# Patient Record
Sex: MALE | Race: WHITE
[De-identification: names, ages, dates, MRNs, and addresses within clinical notes are randomized per-mention and may not be internally consistent; named-entity substitution may affect disease eponyms.]

---

## 2018-09-01 ENCOUNTER — HOSPITAL ENCOUNTER (INPATIENT)
Dept: HOSPITAL 92 - ERS | Age: 83
LOS: 9 days | Discharge: NURSING, REHAB OR LONG-TERM CARE | DRG: 177 | End: 2018-09-10
Attending: FAMILY MEDICINE | Admitting: FAMILY MEDICINE
Payer: MEDICARE

## 2018-09-01 VITALS — BODY MASS INDEX: 24.1 KG/M2

## 2018-09-01 DIAGNOSIS — T45.515A: ICD-10-CM

## 2018-09-01 DIAGNOSIS — K21.9: ICD-10-CM

## 2018-09-01 DIAGNOSIS — Z66: ICD-10-CM

## 2018-09-01 DIAGNOSIS — J96.21: ICD-10-CM

## 2018-09-01 DIAGNOSIS — M10.9: ICD-10-CM

## 2018-09-01 DIAGNOSIS — R04.2: ICD-10-CM

## 2018-09-01 DIAGNOSIS — I25.5: ICD-10-CM

## 2018-09-01 DIAGNOSIS — Z79.82: ICD-10-CM

## 2018-09-01 DIAGNOSIS — I42.0: ICD-10-CM

## 2018-09-01 DIAGNOSIS — J44.0: ICD-10-CM

## 2018-09-01 DIAGNOSIS — I25.10: ICD-10-CM

## 2018-09-01 DIAGNOSIS — I48.2: ICD-10-CM

## 2018-09-01 DIAGNOSIS — Z85.118: ICD-10-CM

## 2018-09-01 DIAGNOSIS — E78.5: ICD-10-CM

## 2018-09-01 DIAGNOSIS — I13.0: ICD-10-CM

## 2018-09-01 DIAGNOSIS — I50.43: ICD-10-CM

## 2018-09-01 DIAGNOSIS — Z79.51: ICD-10-CM

## 2018-09-01 DIAGNOSIS — Z87.891: ICD-10-CM

## 2018-09-01 DIAGNOSIS — D69.6: ICD-10-CM

## 2018-09-01 DIAGNOSIS — N17.9: ICD-10-CM

## 2018-09-01 DIAGNOSIS — Z90.2: ICD-10-CM

## 2018-09-01 DIAGNOSIS — I95.1: ICD-10-CM

## 2018-09-01 DIAGNOSIS — J44.1: ICD-10-CM

## 2018-09-01 DIAGNOSIS — J85.1: Primary | ICD-10-CM

## 2018-09-01 DIAGNOSIS — Z95.828: ICD-10-CM

## 2018-09-01 DIAGNOSIS — Z99.81: ICD-10-CM

## 2018-09-01 DIAGNOSIS — N18.3: ICD-10-CM

## 2018-09-01 DIAGNOSIS — G47.30: ICD-10-CM

## 2018-09-01 LAB
ALBUMIN SERPL BCG-MCNC: 3.7 G/DL (ref 3.4–4.8)
ALP SERPL-CCNC: 83 U/L (ref 40–150)
ALT SERPL W P-5'-P-CCNC: 18 U/L (ref 8–55)
ANION GAP SERPL CALC-SCNC: 10 MMOL/L (ref 10–20)
APTT PPP: 31.4 SEC (ref 22.9–36.1)
AST SERPL-CCNC: 18 U/L (ref 5–34)
BILIRUB SERPL-MCNC: 1.4 MG/DL (ref 0.2–1.2)
BUN SERPL-MCNC: 27 MG/DL (ref 8.4–25.7)
CALCIUM SERPL-MCNC: 10 MG/DL (ref 7.8–10.44)
CHLORIDE SERPL-SCNC: 97 MMOL/L (ref 98–107)
CK MB SERPL-MCNC: 2.3 NG/ML (ref 0–6.6)
CK SERPL-CCNC: 40 U/L (ref 30–200)
CO2 SERPL-SCNC: 34 MMOL/L (ref 23–31)
CREAT CL PREDICTED SERPL C-G-VRATE: 0 ML/MIN (ref 70–130)
GLOBULIN SER CALC-MCNC: 2.6 G/DL (ref 2.4–3.5)
GLUCOSE SERPL-MCNC: 121 MG/DL (ref 83–110)
HGB BLD-MCNC: 15.4 G/DL (ref 14–18)
INR PPP: 1.1
MCH RBC QN AUTO: 32.2 PG (ref 27–31)
MCV RBC AUTO: 93.7 FL (ref 78–98)
MDIFF COMPLETE?: YES
PLATELET # BLD AUTO: 129 THOU/UL (ref 130–400)
POTASSIUM SERPL-SCNC: 3.7 MMOL/L (ref 3.5–5.1)
PROTHROMBIN TIME: 14.5 SEC (ref 12–14.7)
RBC # BLD AUTO: 4.78 MILL/UL (ref 4.7–6.1)
SODIUM SERPL-SCNC: 137 MMOL/L (ref 136–145)
TROPONIN I SERPL DL<=0.01 NG/ML-MCNC: (no result) NG/ML (ref ?–0.03)
WBC # BLD AUTO: 14.1 THOU/UL (ref 4.8–10.8)

## 2018-09-01 PROCEDURE — 86901 BLOOD TYPING SEROLOGIC RH(D): CPT

## 2018-09-01 PROCEDURE — 71045 X-RAY EXAM CHEST 1 VIEW: CPT

## 2018-09-01 PROCEDURE — 83880 ASSAY OF NATRIURETIC PEPTIDE: CPT

## 2018-09-01 PROCEDURE — 96361 HYDRATE IV INFUSION ADD-ON: CPT

## 2018-09-01 PROCEDURE — 84484 ASSAY OF TROPONIN QUANT: CPT

## 2018-09-01 PROCEDURE — 94640 AIRWAY INHALATION TREATMENT: CPT

## 2018-09-01 PROCEDURE — 85025 COMPLETE CBC W/AUTO DIFF WBC: CPT

## 2018-09-01 PROCEDURE — 36415 COLL VENOUS BLD VENIPUNCTURE: CPT

## 2018-09-01 PROCEDURE — 80162 ASSAY OF DIGOXIN TOTAL: CPT

## 2018-09-01 PROCEDURE — 87040 BLOOD CULTURE FOR BACTERIA: CPT

## 2018-09-01 PROCEDURE — 94760 N-INVAS EAR/PLS OXIMETRY 1: CPT

## 2018-09-01 PROCEDURE — 93005 ELECTROCARDIOGRAM TRACING: CPT

## 2018-09-01 PROCEDURE — 86850 RBC ANTIBODY SCREEN: CPT

## 2018-09-01 PROCEDURE — C9113 INJ PANTOPRAZOLE SODIUM, VIA: HCPCS

## 2018-09-01 PROCEDURE — 80053 COMPREHEN METABOLIC PANEL: CPT

## 2018-09-01 PROCEDURE — 94660 CPAP INITIATION&MGMT: CPT

## 2018-09-01 PROCEDURE — 80048 BASIC METABOLIC PNL TOTAL CA: CPT

## 2018-09-01 PROCEDURE — 85730 THROMBOPLASTIN TIME PARTIAL: CPT

## 2018-09-01 PROCEDURE — 86900 BLOOD TYPING SEROLOGIC ABO: CPT

## 2018-09-01 PROCEDURE — A4216 STERILE WATER/SALINE, 10 ML: HCPCS

## 2018-09-01 PROCEDURE — 71046 X-RAY EXAM CHEST 2 VIEWS: CPT

## 2018-09-01 PROCEDURE — 83605 ASSAY OF LACTIC ACID: CPT

## 2018-09-01 PROCEDURE — 82553 CREATINE MB FRACTION: CPT

## 2018-09-01 PROCEDURE — 71250 CT THORAX DX C-: CPT

## 2018-09-01 PROCEDURE — 5A09457 ASSISTANCE WITH RESPIRATORY VENTILATION, 24-96 CONSECUTIVE HOURS, CONTINUOUS POSITIVE AIRWAY PRESSURE: ICD-10-PCS | Performed by: FAMILY MEDICINE

## 2018-09-01 PROCEDURE — 96365 THER/PROPH/DIAG IV INF INIT: CPT

## 2018-09-01 PROCEDURE — 85610 PROTHROMBIN TIME: CPT

## 2018-09-01 RX ADMIN — GUAIFENESIN AND DEXTROMETHORPHAN SCH ML: 100; 10 SYRUP ORAL at 16:12

## 2018-09-01 RX ADMIN — SACUBITRIL AND VALSARTAN SCH: 49; 51 TABLET, FILM COATED ORAL at 20:10

## 2018-09-01 RX ADMIN — GUAIFENESIN AND DEXTROMETHORPHAN SCH ML: 100; 10 SYRUP ORAL at 20:11

## 2018-09-01 NOTE — RAD
PA AND LATERAL CHEST:

 

Date:  09/01/18 

 

INDICATION:

History of hemoptysis. 

 

COMPARISON:  

Prior exam dated 09/01/18 at 1058 hours. 

 

FINDINGS:

The mass-like opacity involving the right mid lung may correspond to trapped fluid within portions of
 the right major fissure; however, there is an air fluid level that is much more evident on the curre
nt examination within the region of the right middle lobe which may reflect a cavitating lesion such 
as a lung abscess. There is opacity present within the right middle lobe. A component of pneumonia is
 not excluded. There are small bilateral pleural effusions. There is cardiomegaly and pulmonary vascu
lar congestion. 

 

IMPRESSION: 

1.      Findings suspicious for right middle lobe air space opacity with associated air fluid level m
ay reflect pneumonia and focal region of cavitation. Malignancy is not entirely excluded but is felt 
to be less likely. 

 

2.  There is a component of CHF. 

 

3.  Dedicated CT of the thorax with IV contrast is recommended for additional characterization. 

 

 

POS: [facility]

## 2018-09-01 NOTE — CT
CT OF CHEST PERFORMED WITHOUT CONTRAST ENHANCEMENT:

 

Date:  09/01/18 

 

HISTORY:  

Hemoptysis. 

 

COMPARISON:  

Chest x-ray done earlier today. CT examination performed 06/01/17. 

 

FINDINGS:

A right upper lobe area of pneumonic consolidation has developed. There is an air fluid level with a 
large cavity associated with this. The main portion of this cavitary area is 9.2 cm in AP dimension a
nd measures approximately 5.1 cm transversely. Superior to this, there is what may be dense consolida
tion of the right lung, or this may just represent a part of the liquefied cavitary process as it gambino
s appear to be possibly connected to this. There are surgical clips seen in the right hilum. I would 
favor this to be a pneumonic process with abscess. 

 

A stent-type device is seen in the descending thoracic aorta. It is unchanged in position since the p
revious CT study. The left lung is essentially clear. Chronic lung changes are seen in both lung fiel
ds. 

 

Extensive coronary calcifications are present. 

 

Visualized liver parenchyma shows no focal findings. Cystic appearing area in the left renal pelvis w
ith some perinephric fat stranding. This was actually present on the previous 06/01/17 study. This do
es not appear to represent an acute process. 

 

IMPRESSION: 

1.  Right upper lobe pneumonic-type process with what appears to be associated abscess formation with
 a large cavitary area with air fluid level. Surgical clips are also seen in this region. 

 

2.  Stable appearance to descending aortic stent-type device. 

 

3.  Extensive coronary artery calcification. 

 

 

POS: [facility]

## 2018-09-01 NOTE — PDOC.EVN
Event Note





- Event Note


Event Note: 





After admission, pt decompensated and required bipap, magnesium, solumedrol.  

He is currently breathing comfortably with sats in the 90's%.  IVF were held 

earlier.  Due to NPO status, IV contrast study today, and currentl creatinine 

of 1.4, will resume IVF.  Monitor for fluid overload and monitor renal 

function.  Pt is on entresto for heart failure - will need to hold this if 

there is any change with his renal function.

## 2018-09-01 NOTE — HP
DATE OF SERVICE:  09/01/2018

 

CHIEF COMPLAINT:  Coughing up blood.

 

HISTORY OF PRESENT ILLNESS:  This is an 83-year-old male with history of COPD 
and oxygen dependence, chronic atrial fibrillation on full anticoagulation, 
cardiomyopathy with chronic systolic and diastolic heart failure, history of 
lung cancer status post resection of the right upper and middle lobes, 
dyslipidemia, GERD, hypertension, who presents to the emergency room with the 
complaint of coughing up blood.  



The patient reports that he noticed a change in his breathing about 10 days ago
, describing it as harder to breathe.  He also states it was harder to keep his 
oxygen levels up and was averaging in the low 80s and high 70s, easily out of 
breath if he was to get up and start moving.  He has been sleeping either in a 
recliner or upright due to difficulty breathing.  At 8:00 a.m. this morning, he 
started coughing up blood, and noticed that over the past couple of days having 
a bubbling sound in his chest and his throat.  In addition, today he complains 
of pain on the right side of his chest and difficulty getting comfortable.  



He denies any fevers, chills, nausea, vomiting, lightheadedness or dizziness.  
He denies any precipitants, or prior history of similar presentation.  He did 
start to change his medications a couple days ago, looking for any of that 
would have the side effect on his breathing and changing them to just half the 
dose which includes digoxin, Eliquis, and Araids.  In addition, he contacted 
Dr. Calle's office and was instructed to call back for admission if he had not 
improved yesterday; however, he was feeling a little bit better.

 

Patient denies any significant weight change, no change in his p.o. intake.  He 
does have lower extremity swelling chronically, but reports that today is 
better than usual.

 

In the emergency room, the patient's chest x-ray abnormal for a cavity with a 
fluid level, CT of the chest obtained, patient started on Zosyn and Levaquin 
due to elevated white blood cell count, treated with 500 mL of normal saline, 
one DuoNeb and Hospitalist called for admission.

 

ALLERGIES:  No known drug allergies.

 

CURRENT MEDICATIONS:  Reconciled with the list provided by the patient:

1.  Albuterol every 4 hours.

2.  Flonase 1 spray each nostril twice a day.

3.  Ipratropium nasal spray twice a day.

4.  Digoxin 0.125 mg Monday, Wednesday, Friday, and Saturday.

5.  Carvedilol 12.5 mg b.i.d.

6.  Torsemide 100 mg daily.

7.  Eliquis 5 mg b.i.d.

8.  Flomax 0.4 mg b.i.d.

9.  Aspirin 81 mg daily.

10.  Lipitor 80 mg at night.

11.  Araids  capsule daily.

12.  Potassium chloride 20 mEq once daily.

13.  Milk of Magnesia at bedtime if needed.

14.  Phenazopyridine 100 mg as needed.

15.  Meclizine 10 mg as needed.

16.  Singulair 10 mg daily.

17.  Ginkgo Biloba b.i.d.

18.  B complex daily.

19.  Allopurinol 300 mg 1/2 tablet daily.

20.  Glycerin ointment in the anal region as needed.

21.  Mucinex 1600 mg at night.

22.  Vitamin B12 daily.

23.  Vitamin D daily.

24.  Docusate 100 mg in the morning.

25.  Tylenol as needed for pain.

26.  Gabapentin 300 mg b.i.d.

27.  Augmentin 500 mg b.i.d., although it does not say start or stop date.

28.  Diltiazem, but uncertain of this medication as on the list it states 'what 
is diltiazem'.

 

PAST MEDICAL HISTORY:

1.  Chronic hypoxic respiratory failure on home oxygen, between 2 and 3 liters 
nasal cannula.

2.  Severe chronic obstructive pulmonary disease.

3.  Coronary artery disease with cardiomyopathy and chronic systolic and 
diastolic heart failure, last ejection fraction in 2016 here was between 40% 
and 50%.  No recent echo is seen in our system.

4.  Chronic atrial fibrillation on full anticoagulation.

5.  Lung cancer with history of resection of the right upper and middle lobes.

6.  Dyslipidemia.

7.  Gastroesophageal reflux disease.

8.  Hypertension.

9.  Benign prostatic hypertrophy.

10.  Coronary artery disease.

11.  Macular degeneration.

12.  Chronic kidney disease, stage 3.

13.  Gout.

 

PAST SURGICAL HISTORY:

1.  Right upper and middle lobe lobectomies in 1986 for lung cancer.

2.  Right inguinal hernia repair.

3.  Umbilical hernia repair.

4.  Achilles repair.

5.  Back surgery x2.

6.  Hemorrhoidectomy.

7.  Aortic aneurysm repair.

8.  Dental implants.

 

FAMILY HISTORY:  Significant for heart failure.

 

SOCIAL HISTORY:  Patient with a 150-pack-year tobacco use, quit in 1998.  No 
alcohol or IV drug use.  He lives with his wife in [place] and his wife 
and daughter Mariaa Davey are his surrogate decision makers.

 

REVIEW OF SYSTEMS:  As noted above.  No change in his weight, fevers, chills, 
nausea, vomiting.  All remaining review of systems are reviewed and negative.

 

PHYSICAL EXAMINATION:

VITAL SIGNS:  Blood pressure 118/63, pulse 95, respirations 20, saturations 94% 
on 2 liters, although when I was in the room, it was 89% on 3 liters nasal 
cannula, temperature is 97.8.

GENERAL:  The patient is awake and alert, appears uncomfortable and tachypneic 
but not in extremis.

HEENT:  His pupils are equal and round.  Extraocular movements are intact.  
Tympanic membranes translucent.  Nasal mucosa is pink.  Oral mucosa is pink and 
moist.

NECK:  Supple, nontender.

LYMPHATICS:  No palpable cervical or supraclavicular lymphadenopathy.

LUNGS:  Rales on the right side, audible wheezing and rhonchi, with fair air 
movement and patient is tachypneic with symmetric chest rise.

HEART:  Distant heart sounds.  No significant murmurs.

ABDOMEN:  Soft.  Present bowel sounds.  No palpable abnormalities.

EXTREMITIES:  He has 1+ pitting edema bilateral.

NEUROLOGIC:  No focal deficits.

SKIN:  No visible rashes.

PSYCHIATRIC:  The patient is alert and oriented x4.

 

LABORATORY DATA:

1.  Personally reviewed.  CBC 14.1, 15.4, 44.8, 129.  INR is 1.1.

2.  Renal panel, 137, 3.7, 97, 34, 27, 1.42, 121.

3.  LFTs normal except total bilirubin of 1.4.  .

 

Chest x-ray is personally reviewed, which showed his findings suspicious for 
right middle lobe airspace opacity with associated air fluid level which may be 
pneumonia or focal region of cavitation.  Malignancy not entirely excluded, but 
felt to be less likely, component of CHF with recommendation for CT of the 
chest.

 

CT of the chest report is pending, by my review it shows cavity 5 cm x 9 cm 
that is fluid filled with associated densities throughout the right middle 
lobe.  Formal report is pending.

 

IMPRESSION:

1.  Hemoptysis in a patient with severe chronic obstructive pulmonary disease, 
chronic hypoxic respiratory failure, and concern for infection and cavitation.

2.  Chronic diastolic and systolic heart failure, this may be contributing some 
to the patient's tachypnea; however, the predominant issue is the hemoptysis.

3.  Chronic atrial fibrillation on chronic oral anticoagulation, and aspirin 
therapy.

4.  Chronic kidney disease stage 3, stable.

5.  Hypertension.

6.  Dyslipidemia.

7.  Benign prostatic hypertrophy.

8.  Gastroesophageal reflux disease.

9.  Macular degeneration.

10.  Gout.

 

PLAN:

1.  Admission to the hospital.

2.  Consultation with Pulmonology.

3.  Stopping the Eliquis and aspirin, scheduling DuoNebs every 4 hours with 
p.r.n. albuterol, cough suppressants.

4.  Due to the leukocytosis, we will continue the antibiotics started in the 
emergency room.

5.  Continuing his cardiac medications to include beta blocker, Entresto, statin
, digoxin.  Monitor renal function

6.  Continuing his gabapentin and allopurinol.

7.  We will type and screen in case transfusion is needed, patient is currently 
hemodynamically stable.

8.  Deep venous thrombosis prophylaxis with pneumatic compression devices.

9.  Gastrointestinal prophylaxis.  We will use IV Protonix as the patient will 
be n.p.o.

10.  Code status is DO NOT RESUSCITATE and this was confirmed with the patient, 
his wife, as well as his daughter.

11.  Anticipated length of stay will be based on interventions needed to manage 
the hemoptysis, as well as discerning the etiology for it, whether it is 
infection, versus bleeding into a bulla, versus other.

12.  The patient is at high risk in the current presentation.  The patient and 
family are aware of the severity of the current situation, and his wife reports 
that if it is his time that they are accepting of it.

13.  Reviewed the plan of care with the patient and family.  No questions or 
further needs at end of evaluation.

 

CHARLES

## 2018-09-01 NOTE — RAD
2 AP VIEWS OF CHEST:

 

Date:  09/01/18 

 

INDICATION:

Difficulty breathing. 

 

COMPARISON:  

Prior exam dated 01/25/18.  

 

FINDINGS:

There are small bilateral pleural effusions, right greater than left. There is some hazy density seen
 along the right heart border extending up and over into the right upper lobe suspicious for fluid wi
thin the right major fissure. There is cardiomegaly and pulmonary vascular congestion. There is posts
urgical change of prior CABG. No pneumothorax is evident.

 

IMPRESSION: 

1.  Findings suspicious for layered hemorrhage within the right major fissure. 2 view chest radiograp
h is recommended. 

 

2.  Cardiomegaly with pulmonary vascular congestion and small bilateral pleural effusions, suspicious
 of CHF. 

 

 

 

POS: [facility]

## 2018-09-02 LAB
ANION GAP SERPL CALC-SCNC: 10 MMOL/L (ref 10–20)
BASOPHILS # BLD AUTO: 0 THOU/UL (ref 0–0.2)
BASOPHILS NFR BLD AUTO: 0.3 % (ref 0–1)
BUN SERPL-MCNC: 31 MG/DL (ref 8.4–25.7)
CALCIUM SERPL-MCNC: 9.6 MG/DL (ref 7.8–10.44)
CHLORIDE SERPL-SCNC: 98 MMOL/L (ref 98–107)
CO2 SERPL-SCNC: 31 MMOL/L (ref 23–31)
CREAT CL PREDICTED SERPL C-G-VRATE: 50 ML/MIN (ref 70–130)
EOSINOPHIL # BLD AUTO: 0 THOU/UL (ref 0–0.7)
EOSINOPHIL NFR BLD AUTO: 0.2 % (ref 0–10)
GLUCOSE SERPL-MCNC: 162 MG/DL (ref 83–110)
HGB BLD-MCNC: 14.3 G/DL (ref 14–18)
LYMPHOCYTES # BLD: 0.5 THOU/UL (ref 1.2–3.4)
LYMPHOCYTES NFR BLD AUTO: 3.5 % (ref 21–51)
MCH RBC QN AUTO: 31.3 PG (ref 27–31)
MCV RBC AUTO: 95.3 FL (ref 78–98)
MDIFF COMPLETE?: YES
MONOCYTES # BLD AUTO: 0.2 THOU/UL (ref 0.11–0.59)
MONOCYTES NFR BLD AUTO: 1.6 % (ref 0–10)
NEUTROPHILS # BLD AUTO: 12.6 THOU/UL (ref 1.4–6.5)
NEUTROPHILS NFR BLD AUTO: 94.4 % (ref 42–75)
PLATELET # BLD AUTO: 119 THOU/UL (ref 130–400)
PLATELET BLD QL SMEAR: (no result)
POTASSIUM SERPL-SCNC: 4 MMOL/L (ref 3.5–5.1)
RBC # BLD AUTO: 4.55 MILL/UL (ref 4.7–6.1)
SODIUM SERPL-SCNC: 135 MMOL/L (ref 136–145)
WBC # BLD AUTO: 13.3 THOU/UL (ref 4.8–10.8)

## 2018-09-02 RX ADMIN — GUAIFENESIN AND DEXTROMETHORPHAN SCH ML: 100; 10 SYRUP ORAL at 17:17

## 2018-09-02 RX ADMIN — SACUBITRIL AND VALSARTAN SCH TAB: 49; 51 TABLET, FILM COATED ORAL at 08:46

## 2018-09-02 RX ADMIN — GUAIFENESIN AND DEXTROMETHORPHAN SCH ML: 100; 10 SYRUP ORAL at 21:04

## 2018-09-02 RX ADMIN — SACUBITRIL AND VALSARTAN SCH TAB: 49; 51 TABLET, FILM COATED ORAL at 21:04

## 2018-09-02 RX ADMIN — GUAIFENESIN AND DEXTROMETHORPHAN SCH: 100; 10 SYRUP ORAL at 05:25

## 2018-09-02 RX ADMIN — GUAIFENESIN AND DEXTROMETHORPHAN SCH: 100; 10 SYRUP ORAL at 08:46

## 2018-09-02 RX ADMIN — GUAIFENESIN AND DEXTROMETHORPHAN SCH ML: 100; 10 SYRUP ORAL at 12:47

## 2018-09-02 RX ADMIN — GUAIFENESIN AND DEXTROMETHORPHAN SCH: 100; 10 SYRUP ORAL at 01:04

## 2018-09-02 NOTE — PDOC.PN
- Subjective


Encounter Start Date: 09/02/18 (f/u hemoptysis)


Encounter Start Time: 07:07


Subjective: Pt remained on bipap overnight.  Denies any complaints or concerns


-: reports that he is more comfortable and sleepy.





- Objective


Resuscitation Status: 


 











Resuscitation Status           DNR:Do Not Resuscitate














Vital Signs & Weight: 


 Vital Signs (12 hours)











  Temp Pulse Resp BP Pulse Ox


 


 09/02/18 06:21   78   


 


 09/02/18 04:07      100


 


 09/02/18 03:57  97.2 F L  94  21 H  130/62  100


 


 09/02/18 01:07   88   


 


 09/02/18 01:06      93 L


 


 09/01/18 23:37  96.3 F L  79  19  105/58 L  95


 


 09/01/18 23:34   85   


 


 09/01/18 23:33      93 L


 


 09/01/18 21:00   92   111/59 L 


 


 09/01/18 20:00  98.6 F  85  18  105/57 L  98








 Weight











Weight                         208 lb 4.8 oz














I&O: 


 











 09/01/18 09/02/18 09/03/18





 06:59 06:59 06:59


 


Intake Total  1150 


 


Output Total  325 


 


Balance  825 











Result Diagrams: 


 09/02/18 03:48





 09/02/18 03:51


Additional Labs: 





300 ml bloody fluid suctioned in past 15 hours


EKG Reviewed by me: Yes (tele - a fib 70-90's, 8-12 beats VT at 15:30)





Phys Exam





- Physical Examination


Constitutional: NAD


on bipap - decreased breath sounds throughout right side.  No audible


wheezing/rhonchi or rales


Cardiovascular: no significant murmur, irregular


Gastrointestinal: soft, non-tender, no distention, positive bowel sounds


Musculoskeletal: no edema


Neurological: non-focal, moves all 4 limbs


Psychiatric: normal affect


Deviation from normal: answers questions appropriately





Dx/Plan


(1) Hemoptysis


Code(s): R04.2 - HEMOPTYSIS   Status: Acute   





(2) Acute and chronic respiratory failure


Code(s): J96.20 - ACUTE AND CHR RESP FAILURE, UNSP W HYPOXIA OR HYPERCAPNIA   

Status: Acute   


Qualifiers: 


   Respiratory failure complication: hypoxia   Qualified Code(s): J96.21 - 

Acute and chronic respiratory failure with hypoxia   





(3) Congestive heart failure


Code(s): I50.9 - HEART FAILURE, UNSPECIFIED   Status: Chronic   


Qualifiers: 


   Heart failure type: combined systolic and diastolic 





(4) COPD exacerbation


Code(s): J44.1 - CHRONIC OBSTRUCTIVE PULMONARY DISEASE W (ACUTE) EXACERBATION   

Status: Acute   





(5) Physical deconditioning


Code(s): R53.81 - OTHER MALAISE   Status: Chronic   





(6) CKD (chronic kidney disease) stage 3, GFR 30-59 ml/min


Code(s): N18.3 - CHRONIC KIDNEY DISEASE, STAGE 3 (MODERATE)   Status: Chronic   





- Plan





* Appreciate Pulm consult - on steroids, antibiotics, nebs, bipap and cough 

suppressant. 


* 


* Renal function stable - low UOP overnight, will slightly increase IVF rate 

and monitor for signs of volume overload


* Continue home meds including entresto for heart failure.  Hold on diuretic 

therapy for now - no overt signs of overload


* 


* Hold anticoagulation and aspirin due to hemoptysis


* 


* CBC this am ordered - to be drawn, type and screen performed yesterday


* 


* dvt prophy - scd's 


* gi prophy - IV protonix


* code status DNR


* 


* Pt remains at high risk in current condition


* 


* Breathing more comfortably today.  Will advance diet to liquids and add 

ensure - RN's only to offer if no nausea.  Will d/c IV protonix.

## 2018-09-02 NOTE — CON
DATE OF CONSULTATION:  09/02/2018.

 

REASON FOR CONSULTATION:  Heart failure.

 

PRIMARY CARDIOLOGIST:  Dr. Kal Calle.

 

HISTORY OF PRESENT ILLNESS:  Mr. Tomas is a very pleasant 83-year-old white gentleman who comes to 
the hospital for shortness of breath.  He has a history of COPD, atrial fibrillation, on Eliquis and 
dilated cardiomyopathy, comes in with hemoptysis and sats in the 70s.  He had to be placed on BiPAP a
nd 300 mL of blood were taken out of his lung by Dr. Cevallos yesterday.  He remains in the BiPAP, he st
ates he feels really short-winded and his sats dropped to the 70s without it.  He has been having to 
take an extra torsemide in the last 2-3 weeks every day to get some extra fluid out and he noticed th
at in the last few days, he was not getting any extra fluid out.  Denies fever or chills.  No chest p
ain, tightness, or pressure.

 

PAST MEDICAL HISTORY:

1.  Chronic obstructive pulmonary disease with 2-3 liters of home O2.

2.  Ischemic cardiomyopathy with an ejection fraction of 25%-30% on echo back in 2017.

3.  Chronic atrial fibrillation on full anticoagulation.

4.  History of lung cancer, status post right upper and middle lobes resection.

5.  Hyperlipidemia.

6.  GERD.

7.  Hypertension.

8.  Benign prostatic hypertrophy.

9.  Coronary artery disease.

10.  Macular degeneration.

11.  Chronic kidney disease stage 3.

12.  Gout.

 

PAST SURGICAL HISTORY:

1.  Right upper and middle lobe resections in 1996 for lung cancer.

2.  Right inguinal hernia repair.

3.  Umbilical hernia repair.

4.  Achilles repair.

5.  Back surgeries.

6.  Hemorrhoidectomy.

7.  Aortic aneurysm repair.

8.  Dental implants.

 

FAMILY HISTORY:  Noncontributory.  Does have history of heart failure in the past.

 

SOCIAL HISTORY:  Former smoker, quit in 1998.  No alcohol or drug use.

 

REVIEW OF SYSTEMS:  A 12-point review of systems was done and is all negative unless stated in histor
y of present illness.

 

ALLERGIES:  No known drug allergies.

 

OUTPATIENT MEDICATIONS:  Include 28 medication list.  Please see Dr. Jaramillo's note for further detail
s.

 

PHYSICAL EXAMINATION:

VITAL SIGNS:  Temperature 97.4, pulse 90, respiratory rate 20, satting 94% on 50% BiPAP, blood pressu
re 147/63.

GENERAL:  Awake, alert, oriented x3, in mild distress.

HEENT:  Normocephalic, atraumatic.

NECK:  Supple.

LUNGS:  Have reduced breath sounds bilateral.

CARDIOVASCULAR:  Distant heart sounds.  S1, S2, irregularly irregular.  Heart rate in the 80s-90s.

ABDOMEN:  Soft, positive bowel sounds.

EXTREMITIES:  1+ edema.

SKIN:  Warm and dry.

 

LABORATORY DATA:  His laboratory work was reviewed.  His CBC with a white count of 14, hemoglobin of 
15, hematocrit 44, platelet count of 129.  Coags were negative.  Chemistries were unremarkable except
 for BUN and creatinine of 1.42 and 27.  GFR was 48, glucose was 121, total bilirubin 1.4.  BNP was 1
88 and troponin was negative x1.  Albumin of 3.7.

 

IMAGING:  Chest x-ray shows what appears to be right middle lobe airspace opacity with air fluid leve
l reflecting either pneumonia or cavitation and some vascular engorgement.

 

CT of the chest showed right upper lobe pneumonic process associated with abscess and large cavitary 
area with air fluid level, descending aorta has a stent repair of 4 cm with stable extensive coronary
 artery calcifications.

 

EKG was reviewed, atrial fibrillation.

 

Telemetry was reviewed, atrial fibrillation with multiple PVCs and several runs of nonsustained VT.

 

ASSESSMENT AND PLAN:

1.  Acute on chronic systolic heart failure.

2.  Reduced left ventricular function at 25%-30% on echo back in 2017.

3.  Hemoptysis.

4.  Pneumonia, complicated.

5.  Nonsustained ventricular tachycardia.

 

PLAN:

1.  We will discuss with primary team and Dr. Cevallos about his fluid status.  I see he is getting 150 
mL an hour.  This was before we knew what his LV function was.  He has already started to develop a l
ittle lower extremity edema, which he tells me he did not have when he came in.  I would hold any int
ravenous fluids for now.  I would not give him any Lasix as he may be septic and his blood pressure i
s already borderline, would just continue as it is at this time.  If his clinical situation with his 
breathing worsens, we will plan on starting IV Lasix at that time.

2.  If he continues to have runs of nonsustained ventricular tachycardia, we will start amiodarone dr martinez; however, at this time, I would like to stay away from this given his severe chronic obstructive p
ulmonary disease.

3.  Continue BiPAP with primary team.

 

Thank you for letting us to participate in the care of your patient.  We will follow.

## 2018-09-02 NOTE — CON
DATE OF CONSULTATION:  09/01/2018

 

HISTORY OF PRESENT ILLNESS:  Genoveva is a patient seen by me several times in the past.  He has been 
seen by me since November of last year.  He has cardiomyopathy and chronic obstructive pulmonary dise
ase.  His COPD was not bad enough to preclude a lobectomy for non-small cell carcinoma many years ago
.

 

He presented with complaints of shortness of breath, cough, chest congestion, and feeling poorly for 
over 10 days.

 

He has not call the office or actually any of his physicians he has had.

 

He did have some hemoptysis coming up to this and his Eliquis dose was cut back.

 

PAST MEDICAL HISTORY:  Otherwise remarkable for,

1.  Coronary artery disease.

2.  Atrial fibrillation.

3.  Lipid disorder.

4.  Gastroesophageal reflux disease.

5.  Hypertension.

6.  BPH.

7.  History of macular degeneration.

8.  History of chronic kidney disease.

9.  History of gout.

10.  History of an inguinal herniorrhaphy, umbilical herniorrhaphy, Achilles tendon repair, two back 
surgeries, herniorrhaphy, abdominal aortic aneurysm repair.

 

SOCIAL HISTORY:  Non-smoker, nondrinker.  He is extremely heavy smoker.

 

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

 

REVIEW OF SYSTEMS:  Ten points otherwise negative.  His hemoptysis has been less than a teaspoon each
 time he coughs up some blood.

 

PHYSICAL EXAMINATION:

GENERAL:  When I saw him, he was in moderate respiratory distress.  He was placed on BiPAP when I saw
 him and given nebulizer treatment and 30 minutes later, he was reexamined and has reported dramatic 
improvement.

VITAL SIGNS:  He is afebrile, heart rates in 80s, respiratory rates in the teens, blood pressure is 9
0/52.

HEENT:  Pupils are equal.  Sclera is anicteric.

NECK:  Supple.

LUNGS:  Remarkable for rhonchi, more on the right than the left.

HEART:  Regular rhythm.  S1 and S2 are normal.

ABDOMEN:  Soft and nontender.

EXTREMITIES:  Without clubbing, cyanosis, or edema.

 

Chest radiograph shows a right mid lung infiltrate.  Chest CT suggestive of an alveolar infiltrate ar
ound large lung abscess.  His thoracic aortic stent is in place.

 

IMPRESSION:

1.  Lung abscess.

2.  Chronic obstructive pulmonary disease exacerbation.

3.  History of sleep apnea.

4.  Acute on chronic respiratory failure, now on BiPAP, clinically improved.

5.  History of atrial fibrillation.

6.  History of right lung bilobectomy.

7.  History of hypertension.

8.  Cardiomyopathy.

 

Mucolytics may be helpful, steroids were added, nebulizer treatments frequency will be increased.  Ge
ntle IV hydration to cover his insensible losses as reasonable.  I will be happy to follow with the o
ther physicians caring for him.

 

Critical care time 40 minutes.

## 2018-09-03 LAB
ANION GAP SERPL CALC-SCNC: 11 MMOL/L (ref 10–20)
BUN SERPL-MCNC: 44 MG/DL (ref 8.4–25.7)
CALCIUM SERPL-MCNC: 9.7 MG/DL (ref 7.8–10.44)
CHLORIDE SERPL-SCNC: 100 MMOL/L (ref 98–107)
CO2 SERPL-SCNC: 30 MMOL/L (ref 23–31)
CREAT CL PREDICTED SERPL C-G-VRATE: 49 ML/MIN (ref 70–130)
GLUCOSE SERPL-MCNC: 163 MG/DL (ref 83–110)
HGB BLD-MCNC: 12.2 G/DL (ref 14–18)
MCH RBC QN AUTO: 31.6 PG (ref 27–31)
MCV RBC AUTO: 94.8 FL (ref 78–98)
MDIFF COMPLETE?: YES
PLATELET # BLD AUTO: 109 THOU/UL (ref 130–400)
PLATELET BLD QL SMEAR: (no result)
POTASSIUM SERPL-SCNC: 4.1 MMOL/L (ref 3.5–5.1)
RBC # BLD AUTO: 3.85 MILL/UL (ref 4.7–6.1)
SODIUM SERPL-SCNC: 137 MMOL/L (ref 136–145)
WBC # BLD AUTO: 18.5 THOU/UL (ref 4.8–10.8)

## 2018-09-03 RX ADMIN — GUAIFENESIN AND DEXTROMETHORPHAN SCH ML: 100; 10 SYRUP ORAL at 17:30

## 2018-09-03 RX ADMIN — SACUBITRIL AND VALSARTAN SCH TAB: 49; 51 TABLET, FILM COATED ORAL at 09:17

## 2018-09-03 RX ADMIN — GUAIFENESIN AND DEXTROMETHORPHAN SCH: 100; 10 SYRUP ORAL at 09:20

## 2018-09-03 RX ADMIN — GUAIFENESIN AND DEXTROMETHORPHAN SCH ML: 100; 10 SYRUP ORAL at 11:54

## 2018-09-03 RX ADMIN — SACUBITRIL AND VALSARTAN SCH: 49; 51 TABLET, FILM COATED ORAL at 20:23

## 2018-09-03 RX ADMIN — GUAIFENESIN AND DEXTROMETHORPHAN SCH: 100; 10 SYRUP ORAL at 01:41

## 2018-09-03 RX ADMIN — GUAIFENESIN AND DEXTROMETHORPHAN SCH: 100; 10 SYRUP ORAL at 22:20

## 2018-09-03 RX ADMIN — GUAIFENESIN AND DEXTROMETHORPHAN SCH: 100; 10 SYRUP ORAL at 06:22

## 2018-09-03 NOTE — PDOC.PN
- Subjective


Encounter Start Date: 09/03/18


Encounter Start Time: 10:07


Subjective: SOB improved





- Objective


Resuscitation Status: 


 











Resuscitation Status           DNR:Do Not Resuscitate














MAR Reviewed: Yes


Vital Signs & Weight: 


 Vital Signs (12 hours)











  Temp Pulse Resp BP BP Pulse Ox


 


 09/03/18 10:01   112 H  24 H    97


 


 09/03/18 09:18     134/60  


 


 09/03/18 09:17   98    


 


 09/03/18 07:56  97.0 F L  83  27 H    98


 


 09/03/18 07:49  97.0 F L  83  27 H   125/63  94 L


 


 09/03/18 06:25   95    


 


 09/03/18 06:24   94  22 H    97


 


 09/03/18 04:23       97


 


 09/03/18 04:00  97.2 F L  80  17   121/66  99


 


 09/03/18 00:00  97.4 F L  80  22 H   98/47 L  98


 


 09/02/18 22:27       98








 Weight











Weight                         208 lb 11.2 oz














I&O: 


 











 09/02/18 09/03/18 09/04/18





 06:59 06:59 06:59


 


Intake Total 1150 2128 


 


Output Total 325 700 


 


Balance 825 1428 











Result Diagrams: 


 09/03/18 03:49





 09/03/18 03:49





Phys Exam





- Physical Examination


Neck: no JVD


hyperresonant, coarse, bilar rales/ rhonchi


Cardiovascular: no significant murmur, irregular


Gastrointestinal: soft, positive bowel sounds


Musculoskeletal: no edema





Dx/Plan


(1) Acute and chronic respiratory failure


Code(s): J96.20 - ACUTE AND CHR RESP FAILURE, UNSP W HYPOXIA OR HYPERCAPNIA   

Status: Acute   


Qualifiers: 


   Respiratory failure complication: hypoxia   Qualified Code(s): J96.21 - 

Acute and chronic respiratory failure with hypoxia   





(2) Hemoptysis


Code(s): R04.2 - HEMOPTYSIS   Status: Acute   





(3) MARIUM (acute kidney injury)


Code(s): N17.9 - ACUTE KIDNEY FAILURE, UNSPECIFIED   Status: Acute   Comment: 

Likely cardiorenal. Monitor. 


   





(4) COPD exacerbation


Code(s): J44.1 - CHRONIC OBSTRUCTIVE PULMONARY DISEASE W (ACUTE) EXACERBATION   

Status: Acute   





(5) Atrial fibrillation with rapid ventricular response


Code(s): I48.91 - UNSPECIFIED ATRIAL FIBRILLATION   Status: Chronic   Comment: 

rate controlled.


Continue Eliquis, Carvedilol.    





(6) CKD (chronic kidney disease) stage 3, GFR 30-59 ml/min


Code(s): N18.3 - CHRONIC KIDNEY DISEASE, STAGE 3 (MODERATE)   Status: Chronic   





(7) HTN (hypertension)


Code(s): I10 - ESSENTIAL (PRIMARY) HYPERTENSION   Status: Chronic   


Qualifiers: 


   Hypertension type: essential hypertension   Qualified Code(s): I10 - 

Essential (primary) hypertension   


Comment: Stable.


Fairly well controlled. Continue home regimen


Lisinopril started 1/27


   





- Plan


cont antibx, BIPAP, steroids, etc


-: discuss with pulmonology





* .

## 2018-09-03 NOTE — RAD
SINGLE VIEW OF THE CHEST:

 

INDICATION: 

History of pulmonary abscess and hemoptysis.

 

COMPARISON: 

CT of the thorax dated 9/1/18.

 

FINDINGS: 

The dense consolidation of the right upper lobe and right middle lobe is again seen.  There is enlarg
ing right pleural effusion.  Small left pleural effusion persists.  Chronic lung changes of the left 
lung are similar-appearing.  No pneumothorax is evident.

 

IMPRESSION: 

Persistent opacification of the right upper lobe and right middle lobe consistent with pneumonia.  Th
ere is enlarging small right pleural effusion.  There is a persistent tiny left pleural effusion.

 

POS: MARY

## 2018-09-04 RX ADMIN — GUAIFENESIN AND DEXTROMETHORPHAN SCH ML: 100; 10 SYRUP ORAL at 12:25

## 2018-09-04 RX ADMIN — GUAIFENESIN AND DEXTROMETHORPHAN SCH ML: 100; 10 SYRUP ORAL at 01:29

## 2018-09-04 RX ADMIN — GUAIFENESIN AND DEXTROMETHORPHAN SCH ML: 100; 10 SYRUP ORAL at 08:58

## 2018-09-04 RX ADMIN — GUAIFENESIN AND DEXTROMETHORPHAN SCH ML: 100; 10 SYRUP ORAL at 20:35

## 2018-09-04 RX ADMIN — SACUBITRIL AND VALSARTAN SCH TAB: 49; 51 TABLET, FILM COATED ORAL at 21:53

## 2018-09-04 RX ADMIN — SACUBITRIL AND VALSARTAN SCH TAB: 49; 51 TABLET, FILM COATED ORAL at 08:58

## 2018-09-04 RX ADMIN — GUAIFENESIN AND DEXTROMETHORPHAN SCH ML: 100; 10 SYRUP ORAL at 05:53

## 2018-09-04 RX ADMIN — Medication SCH ML: at 09:00

## 2018-09-04 RX ADMIN — GUAIFENESIN AND DEXTROMETHORPHAN SCH ML: 100; 10 SYRUP ORAL at 17:28

## 2018-09-04 RX ADMIN — Medication SCH ML: at 20:49

## 2018-09-04 NOTE — PDOC.PN
- Subjective


Encounter Start Date: 09/04/18


Encounter Start Time: 10:28


Subjective: not tolerating off bipap





- Objective


Resuscitation Status: 


 











Resuscitation Status           DNR:Do Not Resuscitate














Vital Signs & Weight: 


 Vital Signs (12 hours)











  Temp Pulse Resp BP BP Pulse Ox


 


 09/04/18 08:58     125/65  


 


 09/04/18 08:00  96.1 F L  92  21 H    97


 


 09/04/18 07:43  96.1 F L  92  21 H   124/64  92 L


 


 09/04/18 07:25   93    


 


 09/04/18 07:22   93  22 H    92 L


 


 09/04/18 04:31   90    


 


 09/04/18 04:30       95


 


 09/04/18 04:00  96.8 F L  85  16   98/56 L  93 L


 


 09/04/18 00:30   83    


 


 09/04/18 00:27       90 L


 


 09/04/18 00:00  96.5 F L  86  16   99/57 L  93 L








 Weight











Weight                         219 lb 6 oz














I&O: 


 











 09/03/18 09/04/18 09/05/18





 06:59 06:59 06:59


 


Intake Total 2128 2472 


 


Output Total 700 1100 


 


Balance 1428 1372 











Result Diagrams: 


 09/03/18 03:49





 09/03/18 03:49





Phys Exam





- Physical Examination


Neck: no JVD


hyperesonant, distant BS with rhonchi


Cardiovascular: RRR


Gastrointestinal: soft, positive bowel sounds


Musculoskeletal: edema present





Dx/Plan


(1) Acute and chronic respiratory failure


Code(s): J96.20 - ACUTE AND CHR RESP FAILURE, UNSP W HYPOXIA OR HYPERCAPNIA   

Status: Acute   


Qualifiers: 


   Respiratory failure complication: hypoxia   Qualified Code(s): J96.21 - 

Acute and chronic respiratory failure with hypoxia   





(2) Hemoptysis


Code(s): R04.2 - HEMOPTYSIS   Status: Acute   





(3) MARIUM (acute kidney injury)


Code(s): N17.9 - ACUTE KIDNEY FAILURE, UNSPECIFIED   Status: Acute   Comment: 

Likely cardiorenal. Monitor. 


   





(4) COPD exacerbation


Code(s): J44.1 - CHRONIC OBSTRUCTIVE PULMONARY DISEASE W (ACUTE) EXACERBATION   

Status: Acute   





(5) Atrial fibrillation with rapid ventricular response


Code(s): I48.91 - UNSPECIFIED ATRIAL FIBRILLATION   Status: Chronic   Comment: 

rate controlled.


Continue Eliquis, Carvedilol.    





(6) CKD (chronic kidney disease) stage 3, GFR 30-59 ml/min


Code(s): N18.3 - CHRONIC KIDNEY DISEASE, STAGE 3 (MODERATE)   Status: Chronic   





(7) HTN (hypertension)


Code(s): I10 - ESSENTIAL (PRIMARY) HYPERTENSION   Status: Chronic   


Qualifiers: 


   Hypertension type: essential hypertension   Qualified Code(s): I10 - 

Essential (primary) hypertension   


Comment: Stable.


Fairly well controlled. Continue home regimen


Lisinopril started 1/27


   





- Plan


poor prognosis, not tolerating unsupported respiration


-: cont bipap, antibx, nebs, iv glucocorticoids





* .

## 2018-09-05 LAB
ANION GAP SERPL CALC-SCNC: 10 MMOL/L (ref 10–20)
BUN SERPL-MCNC: 40 MG/DL (ref 8.4–25.7)
CALCIUM SERPL-MCNC: 9.6 MG/DL (ref 7.8–10.44)
CHLORIDE SERPL-SCNC: 99 MMOL/L (ref 98–107)
CO2 SERPL-SCNC: 36 MMOL/L (ref 23–31)
CREAT CL PREDICTED SERPL C-G-VRATE: 60 ML/MIN (ref 70–130)
GLUCOSE SERPL-MCNC: 165 MG/DL (ref 83–110)
POTASSIUM SERPL-SCNC: 4.5 MMOL/L (ref 3.5–5.1)
SODIUM SERPL-SCNC: 140 MMOL/L (ref 136–145)

## 2018-09-05 RX ADMIN — Medication SCH ML: at 09:15

## 2018-09-05 RX ADMIN — GUAIFENESIN AND DEXTROMETHORPHAN SCH ML: 100; 10 SYRUP ORAL at 05:17

## 2018-09-05 RX ADMIN — GUAIFENESIN AND DEXTROMETHORPHAN SCH ML: 100; 10 SYRUP ORAL at 09:14

## 2018-09-05 RX ADMIN — GUAIFENESIN AND DEXTROMETHORPHAN SCH ML: 100; 10 SYRUP ORAL at 20:54

## 2018-09-05 RX ADMIN — SACUBITRIL AND VALSARTAN SCH TAB: 49; 51 TABLET, FILM COATED ORAL at 09:12

## 2018-09-05 RX ADMIN — SACUBITRIL AND VALSARTAN SCH TAB: 49; 51 TABLET, FILM COATED ORAL at 20:54

## 2018-09-05 RX ADMIN — GUAIFENESIN AND DEXTROMETHORPHAN SCH ML: 100; 10 SYRUP ORAL at 17:54

## 2018-09-05 RX ADMIN — Medication SCH ML: at 20:54

## 2018-09-05 RX ADMIN — GUAIFENESIN AND DEXTROMETHORPHAN SCH ML: 100; 10 SYRUP ORAL at 12:39

## 2018-09-05 RX ADMIN — GUAIFENESIN AND DEXTROMETHORPHAN SCH ML: 100; 10 SYRUP ORAL at 00:47

## 2018-09-05 NOTE — PDOC.PN
- Subjective


Encounter Start Date: 09/05/18


Encounter Start Time: 10:53


Subjective: sob improved, no hemoptysis





- Objective


Resuscitation Status: 


 











Resuscitation Status           DNR:Do Not Resuscitate














MAR Reviewed: Yes


Vital Signs & Weight: 


 Vital Signs (12 hours)











  Temp Pulse Resp BP BP Pulse Ox


 


 09/05/18 09:13     150/76 H  


 


 09/05/18 09:12   101 H    


 


 09/05/18 08:00  97.1 F L  92  17    96


 


 09/05/18 07:47  97.1 F L  92  17   114/50 L  98


 


 09/05/18 07:37   93    


 


 09/05/18 07:32   89  27 H    96


 


 09/05/18 04:00  98.7 F  100  20   107/49 L  94 L


 


 09/05/18 03:27   85  18    95


 


 09/05/18 01:32   84  18    95


 


 09/05/18 00:00  98.0 F  101 H  26 H   124/73  92 L








 Weight











Weight                         220 lb 7 oz














I&O: 


 











 09/04/18 09/05/18 09/06/18





 06:59 06:59 06:59


 


Intake Total 2472 2596 


 


Output Total 1100 1650 


 


Balance 1372 946 











Result Diagrams: 


 09/03/18 03:49





 09/05/18 03:19





Phys Exam





- Physical Examination


Neck: no JVD


Respiratory: clear to auscultation bilateral


Cardiovascular: no significant murmur, irregular


Gastrointestinal: soft, positive bowel sounds


Musculoskeletal: no edema





Dx/Plan


(1) Acute and chronic respiratory failure


Code(s): J96.20 - ACUTE AND CHR RESP FAILURE, UNSP W HYPOXIA OR HYPERCAPNIA   

Status: Acute   


Qualifiers: 


   Respiratory failure complication: hypoxia   Qualified Code(s): J96.21 - 

Acute and chronic respiratory failure with hypoxia   





(2) Hemoptysis


Code(s): R04.2 - HEMOPTYSIS   Status: Acute   





(3) MARIUM (acute kidney injury)


Code(s): N17.9 - ACUTE KIDNEY FAILURE, UNSPECIFIED   Status: Acute   Comment: 

Likely cardiorenal. Monitor. 


   





(4) COPD exacerbation


Code(s): J44.1 - CHRONIC OBSTRUCTIVE PULMONARY DISEASE W (ACUTE) EXACERBATION   

Status: Acute   





(5) Atrial fibrillation with rapid ventricular response


Code(s): I48.91 - UNSPECIFIED ATRIAL FIBRILLATION   Status: Chronic   Comment: 

rate controlled.


Continue Eliquis, Carvedilol.    





(6) CKD (chronic kidney disease) stage 3, GFR 30-59 ml/min


Code(s): N18.3 - CHRONIC KIDNEY DISEASE, STAGE 3 (MODERATE)   Status: Chronic   





(7) HTN (hypertension)


Code(s): I10 - ESSENTIAL (PRIMARY) HYPERTENSION   Status: Chronic   


Qualifiers: 


   Hypertension type: essential hypertension   Qualified Code(s): I10 - 

Essential (primary) hypertension   


Comment: Stable.


Fairly well controlled. Continue home regimen


Lisinopril started 1/27


   





(8) Cardiomyopathy


Code(s): I42.9 - CARDIOMYOPATHY, UNSPECIFIED   Status: Acute   





(9) Acute on chronic systolic (congestive) heart failure


Code(s): I50.23 - ACUTE ON CHRONIC SYSTOLIC (CONGESTIVE) HEART FAILURE   Status

: Acute   





- Plan


on iv diuresis, cont coreg, dig entresto


-: cont antibx, nebs, steroids, etc


-: discuss with Card. and Pulmonology





* .

## 2018-09-06 LAB
ANION GAP SERPL CALC-SCNC: 9 MMOL/L (ref 10–20)
BUN SERPL-MCNC: 35 MG/DL (ref 8.4–25.7)
CALCIUM SERPL-MCNC: 9.4 MG/DL (ref 7.8–10.44)
CHLORIDE SERPL-SCNC: 98 MMOL/L (ref 98–107)
CO2 SERPL-SCNC: 35 MMOL/L (ref 23–31)
CREAT CL PREDICTED SERPL C-G-VRATE: 65 ML/MIN (ref 70–130)
DIGOXIN SERPL-MCNC: 0.48 NG/ML (ref 0.8–2)
GLUCOSE SERPL-MCNC: 186 MG/DL (ref 83–110)
POTASSIUM SERPL-SCNC: 4.2 MMOL/L (ref 3.5–5.1)
SODIUM SERPL-SCNC: 138 MMOL/L (ref 136–145)

## 2018-09-06 RX ADMIN — GUAIFENESIN AND DEXTROMETHORPHAN SCH ML: 100; 10 SYRUP ORAL at 13:14

## 2018-09-06 RX ADMIN — GUAIFENESIN AND DEXTROMETHORPHAN SCH: 100; 10 SYRUP ORAL at 01:00

## 2018-09-06 RX ADMIN — GUAIFENESIN AND DEXTROMETHORPHAN SCH ML: 100; 10 SYRUP ORAL at 16:12

## 2018-09-06 RX ADMIN — SACUBITRIL AND VALSARTAN SCH TAB: 49; 51 TABLET, FILM COATED ORAL at 20:55

## 2018-09-06 RX ADMIN — GUAIFENESIN AND DEXTROMETHORPHAN SCH ML: 100; 10 SYRUP ORAL at 20:54

## 2018-09-06 RX ADMIN — GUAIFENESIN AND DEXTROMETHORPHAN SCH ML: 100; 10 SYRUP ORAL at 09:36

## 2018-09-06 RX ADMIN — Medication SCH ML: at 20:55

## 2018-09-06 RX ADMIN — GUAIFENESIN AND DEXTROMETHORPHAN SCH: 100; 10 SYRUP ORAL at 05:50

## 2018-09-06 RX ADMIN — SACUBITRIL AND VALSARTAN SCH: 49; 51 TABLET, FILM COATED ORAL at 09:36

## 2018-09-06 RX ADMIN — Medication SCH ML: at 09:38

## 2018-09-06 NOTE — PDOC.PN
- Subjective


Encounter Start Date: 09/06/18


Encounter Start Time: 12:35


Subjective: sob improving, some scant hemoptysis today





- Objective


Resuscitation Status: 


 











Resuscitation Status           DNR:Do Not Resuscitate














MAR Reviewed: Yes


Vital Signs & Weight: 


 Vital Signs (12 hours)











  Temp Pulse Resp BP BP Pulse Ox


 


 09/06/18 12:22   96  20    100


 


 09/06/18 11:14   88  18   138/69  95


 


 09/06/18 10:05   96  16    100


 


 09/06/18 09:32     116/73  


 


 09/06/18 07:30  97.1 F L  69  18   131/65  97


 


 09/06/18 06:50       98


 


 09/06/18 06:48   98  20    98


 


 09/06/18 04:00  97.5 F L  106 H  22 H   134/51 L  94 L


 


 09/06/18 03:03   86  20    97


 


 09/06/18 01:30   79    


 


 09/06/18 00:36   89  20    97








 Weight











Weight                         212 lb 1 oz














I&O: 


 











 09/05/18 09/06/18 09/07/18





 06:59 06:59 06:59


 


Intake Total 2596 2626 


 


Output Total 1650 1952 


 


Balance 946 674 











Result Diagrams: 


 09/03/18 03:49





 09/06/18 04:22





Phys Exam





- Physical Examination


Neck: no JVD


grossly clear, clear on Left


Cardiovascular: no significant murmur, irregular


Gastrointestinal: soft, positive bowel sounds


Musculoskeletal: edema present





Dx/Plan


(1) Acute and chronic respiratory failure


Code(s): J96.20 - ACUTE AND CHR RESP FAILURE, UNSP W HYPOXIA OR HYPERCAPNIA   

Status: Acute   


Qualifiers: 


   Respiratory failure complication: hypoxia   Qualified Code(s): J96.21 - 

Acute and chronic respiratory failure with hypoxia   





(2) Hemoptysis


Code(s): R04.2 - HEMOPTYSIS   Status: Acute   





(3) MARIUM (acute kidney injury)


Code(s): N17.9 - ACUTE KIDNEY FAILURE, UNSPECIFIED   Status: Resolved   Comment

: Likely cardiorenal. Monitor. 


   





(4) COPD exacerbation


Code(s): J44.1 - CHRONIC OBSTRUCTIVE PULMONARY DISEASE W (ACUTE) EXACERBATION   

Status: Acute   





(5) Atrial fibrillation with rapid ventricular response


Code(s): I48.91 - UNSPECIFIED ATRIAL FIBRILLATION   Status: Chronic   Comment: 

rate controlled.


Continue Eliquis, Carvedilol.    





(6) CKD (chronic kidney disease) stage 3, GFR 30-59 ml/min


Code(s): N18.3 - CHRONIC KIDNEY DISEASE, STAGE 3 (MODERATE)   Status: Chronic   





(7) HTN (hypertension)


Code(s): I10 - ESSENTIAL (PRIMARY) HYPERTENSION   Status: Chronic   


Qualifiers: 


   Hypertension type: essential hypertension   Qualified Code(s): I10 - 

Essential (primary) hypertension   


Comment: Stable.


Fairly well controlled. Continue home regimen


Lisinopril started 1/27


   





(8) Cardiomyopathy


Code(s): I42.9 - CARDIOMYOPATHY, UNSPECIFIED   Status: Acute   





(9) Acute on chronic systolic (congestive) heart failure


Code(s): I50.23 - ACUTE ON CHRONIC SYSTOLIC (CONGESTIVE) HEART FAILURE   Status

: Acute   





- Plan


cont iv lasix, dig coreg, entresto


-: cont antibx nebs, steroids, O2 , etc


-: discuss with Dr Cevallos





* .

## 2018-09-06 NOTE — RAD
CHEST 1 VIEW:

 

HISTORY: 

CHF.  Dyspnea.

 

COMPARISON: 

9/3/18.

 

FINDINGS: 

Cardiac silhouette magnified and enlarged.  Opacification of the right middle and upper lobes is unch
anged from the previous exam.  Postoperative changes mediastinum.  Pulmonary vasculature remains uppe
r limits of normal.  No evidence of pneumothorax.  Postoperative changes right shoulder.

 

IMPRESSION: 

Right upper and middle lobe opacification and other findings are stable.

 

POS: MARY

## 2018-09-07 LAB
ANION GAP SERPL CALC-SCNC: 11 MMOL/L (ref 10–20)
BUN SERPL-MCNC: 30 MG/DL (ref 8.4–25.7)
CALCIUM SERPL-MCNC: 9.2 MG/DL (ref 7.8–10.44)
CHLORIDE SERPL-SCNC: 97 MMOL/L (ref 98–107)
CO2 SERPL-SCNC: 33 MMOL/L (ref 23–31)
CREAT CL PREDICTED SERPL C-G-VRATE: 73 ML/MIN (ref 70–130)
GLUCOSE SERPL-MCNC: 168 MG/DL (ref 83–110)
HGB BLD-MCNC: 12.6 G/DL (ref 14–18)
MCH RBC QN AUTO: 31.5 PG (ref 27–31)
MCV RBC AUTO: 95.6 FL (ref 78–98)
MDIFF COMPLETE?: YES
PLATELET # BLD AUTO: 131 THOU/UL (ref 130–400)
PLATELET BLD QL SMEAR: (no result)
POTASSIUM SERPL-SCNC: 4 MMOL/L (ref 3.5–5.1)
RBC # BLD AUTO: 4 MILL/UL (ref 4.7–6.1)
SODIUM SERPL-SCNC: 137 MMOL/L (ref 136–145)
WBC # BLD AUTO: 16.7 THOU/UL (ref 4.8–10.8)

## 2018-09-07 RX ADMIN — AMOXICILLIN AND CLAVULANATE POTASSIUM SCH MG: 875; 125 TABLET, FILM COATED ORAL at 21:01

## 2018-09-07 RX ADMIN — GUAIFENESIN AND DEXTROMETHORPHAN SCH ML: 100; 10 SYRUP ORAL at 06:00

## 2018-09-07 RX ADMIN — GUAIFENESIN AND DEXTROMETHORPHAN SCH ML: 100; 10 SYRUP ORAL at 00:01

## 2018-09-07 RX ADMIN — SACUBITRIL AND VALSARTAN SCH TAB: 49; 51 TABLET, FILM COATED ORAL at 21:01

## 2018-09-07 RX ADMIN — SACUBITRIL AND VALSARTAN SCH TAB: 49; 51 TABLET, FILM COATED ORAL at 08:45

## 2018-09-07 RX ADMIN — Medication SCH ML: at 21:02

## 2018-09-07 RX ADMIN — Medication SCH ML: at 08:48

## 2018-09-07 RX ADMIN — GUAIFENESIN AND DEXTROMETHORPHAN SCH ML: 100; 10 SYRUP ORAL at 08:45

## 2018-09-07 RX ADMIN — GUAIFENESIN AND DEXTROMETHORPHAN SCH ML: 100; 10 SYRUP ORAL at 21:01

## 2018-09-07 RX ADMIN — GUAIFENESIN AND DEXTROMETHORPHAN SCH ML: 100; 10 SYRUP ORAL at 16:11

## 2018-09-07 RX ADMIN — GUAIFENESIN AND DEXTROMETHORPHAN SCH ML: 100; 10 SYRUP ORAL at 13:28

## 2018-09-08 LAB
ANION GAP SERPL CALC-SCNC: 8 MMOL/L (ref 10–20)
BUN SERPL-MCNC: 37 MG/DL (ref 8.4–25.7)
CALCIUM SERPL-MCNC: 9.2 MG/DL (ref 7.8–10.44)
CHLORIDE SERPL-SCNC: 96 MMOL/L (ref 98–107)
CO2 SERPL-SCNC: 37 MMOL/L (ref 23–31)
CREAT CL PREDICTED SERPL C-G-VRATE: 65 ML/MIN (ref 70–130)
GLUCOSE SERPL-MCNC: 175 MG/DL (ref 83–110)
POTASSIUM SERPL-SCNC: 3.9 MMOL/L (ref 3.5–5.1)
SODIUM SERPL-SCNC: 137 MMOL/L (ref 136–145)

## 2018-09-08 RX ADMIN — Medication SCH ML: at 21:08

## 2018-09-08 RX ADMIN — AMOXICILLIN AND CLAVULANATE POTASSIUM SCH MG: 875; 125 TABLET, FILM COATED ORAL at 21:08

## 2018-09-08 RX ADMIN — GUAIFENESIN AND DEXTROMETHORPHAN SCH ML: 100; 10 SYRUP ORAL at 05:23

## 2018-09-08 RX ADMIN — GUAIFENESIN AND DEXTROMETHORPHAN SCH ML: 100; 10 SYRUP ORAL at 09:08

## 2018-09-08 RX ADMIN — Medication SCH ML: at 09:06

## 2018-09-08 RX ADMIN — GUAIFENESIN AND DEXTROMETHORPHAN SCH ML: 100; 10 SYRUP ORAL at 17:08

## 2018-09-08 RX ADMIN — SACUBITRIL AND VALSARTAN SCH TAB: 49; 51 TABLET, FILM COATED ORAL at 09:05

## 2018-09-08 RX ADMIN — GUAIFENESIN AND DEXTROMETHORPHAN SCH ML: 100; 10 SYRUP ORAL at 15:08

## 2018-09-08 RX ADMIN — AMOXICILLIN AND CLAVULANATE POTASSIUM SCH MG: 875; 125 TABLET, FILM COATED ORAL at 09:03

## 2018-09-08 RX ADMIN — SACUBITRIL AND VALSARTAN SCH TAB: 49; 51 TABLET, FILM COATED ORAL at 21:07

## 2018-09-08 RX ADMIN — GUAIFENESIN AND DEXTROMETHORPHAN SCH: 100; 10 SYRUP ORAL at 02:08

## 2018-09-08 RX ADMIN — GUAIFENESIN AND DEXTROMETHORPHAN SCH ML: 100; 10 SYRUP ORAL at 21:08

## 2018-09-08 NOTE — PDOC.PN
- Subjective


Encounter Start Date: 09/08/18


Encounter Start Time: 13:00





Patient is seen today, c/o Dizziness on Stbnadoing and Sitting, but Normal with 

lying down, likely has orthostatic [id number].





- Objective


Resuscitation Status: 


 











Resuscitation Status           DNR:Do Not Resuscitate














MAR Reviewed: Yes


Vital Signs & Weight: 


 Vital Signs (12 hours)











  Temp Pulse Pulse Pulse Pulse Resp BP


 


 09/08/18 12:08   84     16 


 


 09/08/18 11:18  97.1 F L  84     19 


 


 09/08/18 11:15    74  39 L  70  


 


 09/08/18 09:04   99     


 


 09/08/18 09:01        107/52 L


 


 09/08/18 08:15   76     16 


 


 09/08/18 08:00  97.1 F L  99     16 


 


 09/08/18 07:27  97.1 F L  77     18 


 


 09/08/18 04:00  97.2 F L  84     22 H 


 


 09/08/18 02:47   103 H     16 














  BP BP BP BP Pulse Ox


 


 09/08/18 12:08     


 


 09/08/18 11:18     96/56 L  90 L


 


 09/08/18 11:15  96/56 L  45/38 L  115/55 L  


 


 09/08/18 09:04     


 


 09/08/18 09:01     


 


 09/08/18 08:15     


 


 09/08/18 08:00      95


 


 09/08/18 07:27     107/58 L  95


 


 09/08/18 04:00     120/62  100


 


 09/08/18 02:47      95








 Weight











Admit Weight                   205 lb


 


Weight                         209 lb














I&O: 


 











 09/07/18 09/08/18 09/09/18





 06:59 06:59 06:59


 


Intake Total 2142 1180 


 


Output Total 8281 2925 


 


Balance 217 -1745 











Result Diagrams: 


 09/07/18 11:51





 09/08/18 03:21


Radiology Reviewed by me: Yes





Phys Exam





- Physical Examination


HEENT: PERRLA, moist MMs


Neck: no nodes, no JVD


Respiratory: no wheezing, no rales


Cardiovascular: RRR, no significant murmur


Gastrointestinal: soft, non-tender


Musculoskeletal: no edema, pulses present


Neurological: non-focal, normal sensation


Lymphatic: no nodes


Psychiatric: normal affect, A&O x 3





Dx/Plan


(1) Acute and chronic respiratory failure


Code(s): J96.20 - ACUTE AND CHR RESP FAILURE, UNSP W HYPOXIA OR HYPERCAPNIA   

Status: Acute   


Qualifiers: 


   Respiratory failure complication: hypoxia   Qualified Code(s): J96.21 - 

Acute and chronic respiratory failure with hypoxia   


Comment: Improving off of Bipap now. Will continue with NEbs today, pt will be 

moved to floor if ok with Dr. Cevallos   





(2) Acute on chronic systolic (congestive) heart failure


Code(s): I50.23 - ACUTE ON CHRONIC SYSTOLIC (CONGESTIVE) HEART FAILURE   Status

: Acute   Comment:  ACE/ BB/ Digoxin. Pt diuresis need to be put on Hold,. pt 

developing orthostasis.   





(3) Cardiomyopathy


Code(s): I42.9 - CARDIOMYOPATHY, UNSPECIFIED   Status: Acute   Comment: 

continue with BB, ACEI.    





(4) Hemoptysis


Code(s): R04.2 - HEMOPTYSIS   Status: Acute   Comment: Persistant but in 

Streaks of Blood in sputum. No drop in Hb.   





(5) COPD exacerbation


Code(s): J44.1 - CHRONIC OBSTRUCTIVE PULMONARY DISEASE W (ACUTE) EXACERBATION   

Status: Acute   Comment: Continue with Nebs, Dr. Cevallos following. Stbale off of 

Bipap last night,   





(6) CKD (chronic kidney disease) stage 3, GFR 30-59 ml/min


Code(s): N18.3 - CHRONIC KIDNEY DISEASE, STAGE 3 (MODERATE)   Status: Chronic   





(7) HTN (hypertension)


Code(s): I10 - ESSENTIAL (PRIMARY) HYPERTENSION   Status: Chronic   


Qualifiers: 


   Hypertension type: essential hypertension   Qualified Code(s): I10 - 

Essential (primary) hypertension   


Comment: Hold HTN meds





   





(8) Physical deconditioning


Code(s): R53.81 - OTHER MALAISE   Status: Chronic   Comment: Plan for SNF 

placmenet on monday,.   





(9) Orthostatic hypotension


Code(s): I95.1 - ORTHOSTATIC HYPOTENSION   Status: Acute   Comment: Will need 

to hold lasix. Monitor closley High risk for falls,   





- Plan


cont current plan of care, higgins catheter, continue antibiotics, speech therapy

, respiratory therapy, incentive spirometry, out of bed/ambulate, DVT proph w/

SCDs





* .








Review of Systems





- Review of Systems


Eyes: negative: Pain, Vision Change, Conjunctivae Inflammation, Eyelid 

Inflammation, Redness, Other


ENT: negative: Ear Pain, Ear Discharge, Nose Pain, Nose Discharge, Nose 

Congestion, Mouth Pain, Mouth Swelling, Throat Pain, Throat Swelling, Other


Respiratory: negative: Cough, Dry, Shortness of Breath, Hemoptysis, SOB with 

Excertion, Pleuritic Pain, Sputum, Wheezing


Cardiovascular: negative: chest pain, palpitations, orthopnea, paroxysmal 

nocturnal dyspnea, edema, light headedness, other


Gastrointestinal: negative: Nausea, Vomiting, Abdominal Pain, Diarrhea, 

Constipation, Melena, Hematochezia, Other


Genitourinary: negative: Dysuria, Frequency, Incontinence, Hematuria, Retention

, Other


Skin: negative: Rash, Lesions, Terrance, Bruising, Other





- Medications/Allergies


Allergies/Adverse Reactions: 


 Allergies











Allergy/AdvReac Type Severity Reaction Status Date / Time


 


No Known Drug Allergies Allergy Unknown  Verified 01/23/18 22:50











Medications: 


 Current Medications





Albuterol Sulfate (Ventolin)  2.5 mg NEB [id number] PRN


   PRN Reason: SOB &/or Wheezing


Albuterol/Ipratropium (Duoneb)  3 ml NEB [id number] Formerly [facility]


   Last Admin: 09/08/18 12:08 Dose:  3 ml


Albuterol/Ipratropium (Duoneb)  3 ml NEB PRN PRN


   PRN Reason:  SOB


Allopurinol (Zyloprim)  150 mg PO DAILY [facility]


   Last Admin: 09/08/18 09:03 Dose:  150 mg


Amoxicillin/Clavulanate Potassium (Augmentin)  875 mg PO Q12HR [facility]


   Last Admin: 09/08/18 09:03 Dose:  875 mg


Atorvastatin Calcium (Lipitor)  40 mg PO HS [facility]


   Last Admin: 09/07/18 21:01 Dose:  40 mg


Benzonatate (Tessalon)  100 mg PO TID [facility]


   Last Admin: 09/08/18 09:04 Dose:  100 mg


Carvedilol (Coreg)  6.25 mg PO BID-WM Former[facility]


   Last Admin: 09/08/18 09:01 Dose:  6.25 mg


Digoxin (Lanoxin)  0.125 mg PO MoWeFrSa@0900 [facility]


   Last Admin: 09/08/18 09:04 Dose:  0.125 mg


Docusate Sodium (Colace Liquid)  100 mg PO BID [facility]


   Last Admin: 09/08/18 09:04 Dose:  100 mg


Fluticasone Propionate (Flonase Nasal Spray)  0 gm NASAL DAILY [facility]


   Last Admin: 09/08/18 09:04 Dose:  1 spray


Furosemide (Lasix)  40 mg SLOW IVP 0600,1400 [facility]


   Last Admin: 09/08/18 05:23 Dose:  40 mg


Gabapentin (Neurontin)  300 mg PO BID [facility]


   Last Admin: 09/08/18 09:05 Dose:  300 mg


Guaifenesin/Dextromethorphan (Robitussin Dm)  15 ml PO Q4HR [facility]


   Last Admin: 09/08/18 09:08 Dose:  15 ml


Montelukast Sodium (Singulair)  10 mg PO QPM [facility]


   Last Admin: 09/07/18 21:01 Dose:  10 mg


Prednisone (Prednisone)  20 mg PO BID-[facility]


Sacubitril/Valsartan (Entresto 49 Mg-51 Mg Tablet)  1 tab PO BID [facility]


   Last Admin: 09/08/18 09:05 Dose:  1 tab


Sodium Chloride (Flush - Normal Saline)  10 ml IVF Q12HR [facility]


   Last Admin: 09/08/18 09:06 Dose:  10 ml


Sodium Chloride (Flush - Normal Saline)  10 ml IVF PRN PRN


   PRN Reason: Saline Flush


   Last Admin: 09/06/18 09:48 Dose:  10 ml


Tamsulosin HCl (Flomax)  0.4 mg PO BID [facility]


   Last Admin: 09/08/18 09:05 Dose:  0.4 mg

## 2018-09-08 NOTE — PDOC.PN
- Subjective


Encounter Start Date: 09/07/18


Encounter Start Time: 10:00





Patient is seen today, alert and oriented. Off of Bipap. Pt is still on clear 

liquids





- Objective


Resuscitation Status: 


 











Resuscitation Status           DNR:Do Not Resuscitate














MAR Reviewed: Yes


Vital Signs & Weight: 


 Vital Signs (12 hours)











  Temp Pulse Resp BP Pulse Ox


 


 09/08/18 07:27  97.1 F L  77  18  107/58 L  95


 


 09/08/18 04:00  97.2 F L  84  22 H  120/62  100


 


 09/08/18 02:47   103 H  16   95


 


 09/08/18 00:00  98.0 F  84  20  110/63  98


 


 09/07/18 22:31   92  16   97








 Weight











Weight                         209 lb














I&O: 


 











 09/07/18 09/08/18 09/09/18





 06:59 06:59 06:59


 


Intake Total 2142 1180 


 


Output Total 8745 2925 


 


Balance 217 -1745 











Result Diagrams: 


 09/07/18 11:51





 09/08/18 03:21


Radiology Reviewed by me: Yes





Phys Exam





- Physical Examination


Neck: no nodes, no JVD


Respiratory: no wheezing, no rales


Cardiovascular: RRR, no significant murmur


Gastrointestinal: soft, non-tender


Musculoskeletal: no edema, pulses present


Neurological: non-focal, normal sensation


Lymphatic: no nodes


Psychiatric: normal affect, A&O x 3


Skin: normal turgor





Dx/Plan


(1) Acute and chronic respiratory failure


Code(s): J96.20 - ACUTE AND CHR RESP FAILURE, UNSP W HYPOXIA OR HYPERCAPNIA   

Status: Acute   


Qualifiers: 


   Respiratory failure complication: hypoxia   Qualified Code(s): J96.21 - 

Acute and chronic respiratory failure with hypoxia   


Comment: Improving off of Bipap now. Will continue with NEbs today, pt will be 

moved to floor if ok with Dr. Cevallos   





(2) Acute on chronic systolic (congestive) heart failure


Code(s): I50.23 - ACUTE ON CHRONIC SYSTOLIC (CONGESTIVE) HEART FAILURE   Status

: Acute   Comment: continue with cautious diuresis. ACE/ BB/ Digoxin.   





(3) Cardiomyopathy


Code(s): I42.9 - CARDIOMYOPATHY, UNSPECIFIED   Status: Acute   Comment: 

continue with BB, ACEI.    





(4) Hemoptysis


Code(s): R04.2 - HEMOPTYSIS   Status: Acute   Comment: Persistant but in 

Streaks of Blood in sputum. No drop in Hb.   





(5) COPD exacerbation


Code(s): J44.1 - CHRONIC OBSTRUCTIVE PULMONARY DISEASE W (ACUTE) EXACERBATION   

Status: Acute   Comment: Continue with Nebs, Dr. Cevallos following. Stbale off of 

Bipap last night,   





(6) CKD (chronic kidney disease) stage 3, GFR 30-59 ml/min


Code(s): N18.3 - CHRONIC KIDNEY DISEASE, STAGE 3 (MODERATE)   Status: Chronic   





(7) HTN (hypertension)


Code(s): I10 - ESSENTIAL (PRIMARY) HYPERTENSION   Status: Chronic   


Qualifiers: 


   Hypertension type: essential hypertension   Qualified Code(s): I10 - 

Essential (primary) hypertension   


Comment: Stable.





   





(8) Physical deconditioning


Code(s): R53.81 - OTHER MALAISE   Status: Chronic   Comment: Plan for SNF 

placmenet on monday,.   





- Plan


cont current plan of care, continue antibiotics, PT/OT, , speech 

therapy, respiratory therapy, incentive spirometry, out of bed/ambulate, DVT 

proph w/SCDs





* .








Review of Systems





- Review of Systems


Constitutional: negative: fever, chills, sweats, weakness, malaise, other


Eyes: negative: Pain, Vision Change, Conjunctivae Inflammation, Eyelid 

Inflammation, Redness, Other


ENT: negative: Ear Pain, Ear Discharge, Nose Pain, Nose Discharge, Nose 

Congestion, Mouth Pain, Mouth Swelling, Throat Pain, Throat Swelling, Other


Respiratory: negative: Cough, Dry, Shortness of Breath, Hemoptysis, SOB with 

Excertion, Pleuritic Pain, Sputum, Wheezing


Cardiovascular: negative: chest pain, palpitations, orthopnea, paroxysmal 

nocturnal dyspnea, edema, light headedness, other


Gastrointestinal: negative: Nausea, Vomiting, Abdominal Pain, Diarrhea, 

Constipation, Melena, Hematochezia, Other


Genitourinary: negative: Dysuria, Frequency, Incontinence, Hematuria, Retention

, Other


Musculoskeletal: negative: Neck Pain, Shoulder Pain, Arm Pain, Back Pain, Hand 

Pain, Leg Pain, Foot Pain, Other


Skin: negative: Rash, Lesions, Terrance, Bruising, Other





- Medications/Allergies


Allergies/Adverse Reactions: 


 Allergies











Allergy/AdvReac Type Severity Reaction Status Date / Time


 


No Known Drug Allergies Allergy Unknown  Verified 01/23/18 22:50











Medications: 


 Current Medications





Albuterol Sulfate (Ventolin)  2.5 mg NEB [id number] PRN


   PRN Reason: SOB &/or Wheezing


Albuterol/Ipratropium (Duoneb)  3 ml NEB [id number] [facility]


   Last Admin: 09/08/18 02:47 Dose:  3 ml


Albuterol/Ipratropium (Duoneb)  3 ml NEB PRN PRN


   PRN Reason:  SOB


Allopurinol (Zyloprim)  150 mg PO DAILY [facility]


   Last Admin: 09/07/18 08:45 Dose:  150 mg


Amoxicillin/Clavulanate Potassium (Augmentin)  875 mg PO Q12HR [facility]


   Last Admin: 09/07/18 21:01 Dose:  875 mg


Atorvastatin Calcium (Lipitor)  40 mg PO HS [facility]


   Last Admin: 09/07/18 21:01 Dose:  40 mg


Benzonatate (Tessalon)  100 mg PO TID [facility]


   Last Admin: 09/07/18 21:01 Dose:  100 mg


Carvedilol (Coreg)  6.25 mg PO BID-[facility]


   Last Admin: 09/07/18 16:12 Dose:  6.25 mg


Digoxin (Lanoxin)  0.125 mg PO MoWeFrSa@0900 [facility]


   Last Admin: 09/07/18 08:46 Dose:  0.125 mg


Docusate Sodium (Colace Liquid)  100 mg PO BID [facility]


   Last Admin: 09/07/18 21:01 Dose:  100 mg


Fluticasone Propionate (Flonase Nasal Spray)  0 gm NASAL DAILY [facility]


   Last Admin: 09/07/18 08:49 Dose:  2 spray


Furosemide (Lasix)  40 mg SLOW IVP 0600,1400 [facility]


   Last Admin: 09/08/18 05:23 Dose:  40 mg


Gabapentin (Neurontin)  300 mg PO BID [facility]


   Last Admin: 09/07/18 21:01 Dose:  300 mg


Guaifenesin/Dextromethorphan (Robitussin Dm)  15 ml PO Q4HR [facility]


   Last Admin: 09/08/18 05:23 Dose:  15 ml


Methylprednisolone Sodium Succinate (Solu-Medrol)  40 mg IVP Q12HR [facility]


   Last Admin: 09/07/18 21:01 Dose:  40 mg


Montelukast Sodium (Singulair)  10 mg PO QPM [facility]


   Last Admin: 09/07/18 21:01 Dose:  10 mg


Sacubitril/Valsartan (Entresto 49 Mg-51 Mg Tablet)  1 tab PO BID [facility]


   Last Admin: 09/07/18 21:01 Dose:  1 tab


Sodium Chloride (Flush - Normal Saline)  10 ml IVF Q12HR [facility]


   Last Admin: 09/07/18 21:02 Dose:  10 ml


Sodium Chloride (Flush - Normal Saline)  10 ml IVF PRN PRN


   PRN Reason: Saline Flush


   Last Admin: 09/06/18 09:48 Dose:  10 ml


Tamsulosin HCl (Flomax)  0.4 mg PO BID [facility]


   Last Admin: 09/07/18 21:01 Dose:  0.4 mg

## 2018-09-08 NOTE — EKG
Test Reason : 

Blood Pressure : ***/*** mmHG

Vent. Rate : 090 BPM     Atrial Rate : 394 BPM

   P-R Int : 000 ms          QRS Dur : 102 ms

    QT Int : 322 ms       P-R-T Axes : 000 021 253 degrees

   QTc Int : 393 ms

 

Atrial fibrillation

Incomplete right bundle branch block



Abnormal ECG

 

Confirmed by SRIRAM GARRETT MD ([id number]),  SHERIDAN MARQUEZ ([id number]) on 9/8/2018 8:30:06 PM

 

Referred By:             Confirmed By:SRIRAM GARRETT MD

## 2018-09-09 LAB
ANION GAP SERPL CALC-SCNC: 11 MMOL/L (ref 10–20)
BUN SERPL-MCNC: 45 MG/DL (ref 8.4–25.7)
CALCIUM SERPL-MCNC: 9.2 MG/DL (ref 7.8–10.44)
CHLORIDE SERPL-SCNC: 95 MMOL/L (ref 98–107)
CO2 SERPL-SCNC: 35 MMOL/L (ref 23–31)
CREAT CL PREDICTED SERPL C-G-VRATE: 66 ML/MIN (ref 70–130)
GLUCOSE SERPL-MCNC: 154 MG/DL (ref 83–110)
POTASSIUM SERPL-SCNC: 3.5 MMOL/L (ref 3.5–5.1)
SODIUM SERPL-SCNC: 137 MMOL/L (ref 136–145)

## 2018-09-09 RX ADMIN — SACUBITRIL AND VALSARTAN SCH: 49; 51 TABLET, FILM COATED ORAL at 11:02

## 2018-09-09 RX ADMIN — Medication SCH ML: at 15:14

## 2018-09-09 RX ADMIN — AMOXICILLIN AND CLAVULANATE POTASSIUM SCH MG: 875; 125 TABLET, FILM COATED ORAL at 10:13

## 2018-09-09 RX ADMIN — GUAIFENESIN AND DEXTROMETHORPHAN SCH ML: 100; 10 SYRUP ORAL at 15:14

## 2018-09-09 RX ADMIN — GUAIFENESIN AND DEXTROMETHORPHAN SCH: 100; 10 SYRUP ORAL at 02:32

## 2018-09-09 RX ADMIN — Medication SCH ML: at 23:12

## 2018-09-09 RX ADMIN — GUAIFENESIN AND DEXTROMETHORPHAN SCH ML: 100; 10 SYRUP ORAL at 18:27

## 2018-09-09 RX ADMIN — SACUBITRIL AND VALSARTAN SCH TAB: 49; 51 TABLET, FILM COATED ORAL at 23:12

## 2018-09-09 RX ADMIN — AMOXICILLIN AND CLAVULANATE POTASSIUM SCH MG: 875; 125 TABLET, FILM COATED ORAL at 23:12

## 2018-09-09 RX ADMIN — GUAIFENESIN AND DEXTROMETHORPHAN SCH ML: 100; 10 SYRUP ORAL at 23:10

## 2018-09-09 RX ADMIN — GUAIFENESIN AND DEXTROMETHORPHAN SCH ML: 100; 10 SYRUP ORAL at 10:11

## 2018-09-09 RX ADMIN — GUAIFENESIN AND DEXTROMETHORPHAN SCH ML: 100; 10 SYRUP ORAL at 06:35

## 2018-09-09 NOTE — PDOC.CTH
<Bri Sparks - Last Filed: 09/09/18 18:31>





Cardiology Progress Note





- Subjective





The pt seen and examined.  No overnight events.  No cardiac complaints. Cont. 

having Hemaptysis.  





- Objective


 Vital Signs











  Temp Pulse Pulse Pulse Pulse Pulse Resp


 


 09/09/18 18:26       


 


 09/09/18 14:57  97.7 F  80      16


 


 09/09/18 14:54   66      16


 


 09/09/18 12:25  97.6 F  66      16


 


 09/09/18 11:16   80      16


 


 09/09/18 10:10       


 


 09/09/18 09:00  96.8 F L  82      16


 


 09/09/18 08:40    70  66  65  81 


 


 09/09/18 08:30       


 


 09/09/18 08:28   82      16


 


 09/09/18 07:43  96.8 F L  72      17














  BP BP BP BP BP BP Pulse Ox


 


 09/09/18 18:26  107/64      


 


 09/09/18 14:57       93/57 L  100


 


 09/09/18 14:54       


 


 09/09/18 12:25       108/53 L  97


 


 09/09/18 11:16       


 


 09/09/18 10:10  108/48 L      


 


 09/09/18 09:00        95


 


 09/09/18 08:40   84/42 L  125/44 L  105/54 L  118/66  


 


 09/09/18 08:30        96


 


 09/09/18 08:28       


 


 09/09/18 07:43       130/67  100














  Pulse Ox Pulse Ox Pulse Ox Pulse Ox


 


 09/09/18 18:26    


 


 09/09/18 14:57    


 


 09/09/18 14:54    


 


 09/09/18 12:25    


 


 09/09/18 11:16    


 


 09/09/18 10:10    


 


 09/09/18 09:00    


 


 09/09/18 08:40  96  98  100  93 L


 


 09/09/18 08:30    


 


 09/09/18 08:28    


 


 09/09/18 07:43    








 











Admit Weight                   205 lb


 


Weight                         209 lb














 











 09/08/18 09/09/18 09/10/18





 06:59 06:59 06:59


 


Intake Total 1180 1815 


 


Output Total 2925 1550 900


 


Balance -1015 265 -900














- Physical Examination


General/Neuro: alert & oriented x3


Neck: no JVD present


Lungs: other: (corases and diminished at bases Rt>Lt)


Heart: other: (irregular)


Abdomen: soft


Extremities: other: (generalized edema)





- Telemetry


Telemetry Rhythm: AFib 90-100s





- Labs


Result Diagrams: 


 09/07/18 11:51





 09/09/18 05:41


 Troponin/CKMB











CK-MB (CK-2)  2.3 ng/mL (0-6.6)   09/01/18  11:01    


 


Troponin I  Less than  0.010 ng/mL (< 0.028)   09/01/18  11:01    














- Assessment/Plan





1. Chronic systolic HF - Stable with Lasix 40mg IV qd and Entresto. Renal 

function is stable; 


2. Chronic AFib - HR stable with Coreg 6.25mg BID and Digoxin 0.125mg qd; not 

on OAC due to Hemorrhage from lung


3. Rt lung Abscess with hemorrhage and hx of lung Ca - On PO Antibiotics and 

steroid, managed by pulmonologist


4. COPD exacerbation - managed by pulmonologist


5. CKD stage 3 - stable


6. Hyperlipidemia - on Statin


MAR reviewed








Review of Systems





- Review of Systems


Constitutional: reports: no symptoms reported


EENTM: reports: no symptoms reported


Respiratory: reports: shortness of breath


Cardiac (ROS): reports: no symptoms reported


ABD/GI: reports: no symptoms reported


: reports: no symptoms reported


Musculoskeletal: reports: no symptoms reported


Skin: reports: no symptoms reported





<ANDREA Lima - Last Filed: 09/10/18 11:23>





Cardiology Progress Note





- Objective


 Vital Signs











  Temp Pulse Resp BP BP Pulse Ox


 


 09/10/18 10:17   87  20   


 


 09/10/18 09:56   87    


 


 09/10/18 09:55     116/78  


 


 09/10/18 08:05  97.4 F L  87  20    96


 


 09/10/18 07:59  97.4 F L  80  20   116/78  96


 


 09/10/18 07:50   79  20   


 


 09/10/18 04:00  97.3 F L  88  16   108/70  95


 


 09/10/18 03:57       92 L


 


 09/10/18 00:14       93 L








 











Admit Weight                   205 lb


 


Weight                         209 lb














 











 09/09/18 09/10/18 09/11/18





 06:59 06:59 06:59


 


Intake Total 1815 1680 


 


Output Total 1550 1175 


 


Balance 265 505 














- Labs


Result Diagrams: 


 09/10/18 05:12





 09/10/18 05:12


 Troponin/CKMB











CK-MB (CK-2)  2.3 ng/mL (0-6.6)   09/01/18  11:01    


 


Troponin I  Less than  0.010 ng/mL (< 0.028)   09/01/18  11:01    














- Assessment/Plan





pt. seen and eval. by me. I agree with the A/P by the NP.

## 2018-09-09 NOTE — PDOC.PN
- Subjective


Encounter Start Date: 09/09/18


Encounter Start Time: 08:40


-: old records requested/rev





Patient seen and examined. No new complaints. No overnight events





pt is weak, has orthostatic hypotension, on lasix





- Objective


Resuscitation Status: 


 











Resuscitation Status           DNR:Do Not Resuscitate














MAR Reviewed: Yes


Vital Signs & Weight: 


 Vital Signs (12 hours)











  Temp Pulse Resp BP BP Pulse Ox


 


 09/09/18 10:10     108/48 L  


 


 09/09/18 08:30       96


 


 09/09/18 08:28   82  16   


 


 09/09/18 07:43  96.8 F L  72  17   130/67  100


 


 09/09/18 04:00  97.3 F L  87  18   126/70  98


 


 09/09/18 02:34   87  16    92 L


 


 09/09/18 00:00   88  18   101/42 L  94 L








 Weight











Admit Weight                   205 lb


 


Weight                         209 lb














I&O: 


 











 09/08/18 09/09/18 09/10/18





 06:59 06:59 06:59


 


Intake Total 1180 1815 


 


Output Total 2925 1550 900


 


Balance -1745 265 -900











Result Diagrams: 


 09/07/18 11:51





 09/09/18 05:41


Radiology Reviewed by me: Yes


EKG Reviewed by me: Yes





Phys Exam





- Physical Examination


Constitutional: NAD


HEENT: PERRLA, moist MMs, sclera anicteric


Neck: no JVD, supple


Respiratory: no wheezing, no rhonchi


reduced air entry at base


Cardiovascular: no significant murmur, irregular


Gastrointestinal: soft, non-tender, no distention, positive bowel sounds


Musculoskeletal: no edema, pulses present


Neurological: non-focal, normal sensation


Lymphatic: no nodes


Psychiatric: normal affect


Skin: no rash, normal turgor





Dx/Plan


(1) COPD exacerbation


Code(s): J44.1 - CHRONIC OBSTRUCTIVE PULMONARY DISEASE W (ACUTE) EXACERBATION   

Status: Acute   Comment:     





(2) Right upper lobe pneumonia


Code(s): J18.1 - LOBAR PNEUMONIA, UNSPECIFIED ORGANISM   Status: Acute   Comment

: mass with h'ge vs abscess   





(3) CKD (chronic kidney disease) stage 3, GFR 30-59 ml/min


Code(s): N18.3 - CHRONIC KIDNEY DISEASE, STAGE 3 (MODERATE)   Status: Chronic   





(4) Chronic atrial fibrillation


Code(s): I48.2 - CHRONIC ATRIAL FIBRILLATION   Status: Chronic   





(5) Chronic systolic heart failure, ACC/AHA stage C


Code(s): I50.22 - CHRONIC SYSTOLIC (CONGESTIVE) HEART FAILURE   Status: Chronic

   





(6) Dyslipidemia


Code(s): E78.5 - HYPERLIPIDEMIA, UNSPECIFIED   Status: Chronic   





(7) HTN (hypertension)


Code(s): I10 - ESSENTIAL (PRIMARY) HYPERTENSION   Status: Chronic   


Qualifiers: 


   Hypertension type: essential hypertension   Qualified Code(s): I10 - 

Essential (primary) hypertension   


Comment: Hold HTN meds





   





(8) Physical deconditioning


Code(s): R53.81 - OTHER MALAISE   Status: Chronic   Comment: Plan for SNF 

placmenet on monday,.   





- Plan


cont current plan of care, continue antibiotics, respiratory therapy





* reduce lasix 40 mg iv daily


* continue oral antibiotic and oral steroid as per pulmonary


* medication reviewed as below


* symptomatic treatment


* will monitor on tele


* will need snu placement when arranged.








Review of Systems





- Review of Systems


Constitutional: weakness.  negative: fever, chills, sweats, malaise, other


Eyes: negative: Pain, Vision Change, Conjunctivae Inflammation, Eyelid 

Inflammation, Redness, Other


ENT: negative: Ear Pain, Ear Discharge, Nose Pain, Nose Discharge, Nose 

Congestion, Mouth Pain, Mouth Swelling, Throat Pain, Throat Swelling, Other


Respiratory: negative: Cough, Dry, Shortness of Breath, Hemoptysis, SOB with 

Excertion, Pleuritic Pain, Sputum, Wheezing


Cardiovascular: negative: chest pain, palpitations, orthopnea, paroxysmal 

nocturnal dyspnea, edema, light headedness, other


Gastrointestinal: negative: Nausea, Vomiting, Abdominal Pain, Diarrhea, 

Constipation, Melena, Hematochezia, Other


Genitourinary: negative: Dysuria, Frequency, Incontinence, Hematuria, Retention

, Other


Musculoskeletal: negative: Neck Pain, Shoulder Pain, Arm Pain, Back Pain, Hand 

Pain, Leg Pain, Foot Pain, Other





- Medications/Allergies


Allergies/Adverse Reactions: 


 Allergies











Allergy/AdvReac Type Severity Reaction Status Date / Time


 


No Known Drug Allergies Allergy Unknown  Verified 01/23/18 22:50











Medications: 


 Current Medications





Albuterol Sulfate (Ventolin)  2.5 mg NEB [id number] PRN


   PRN Reason: SOB &/or Wheezing


Albuterol/Ipratropium (Duoneb)  3 ml NEB [id number] [facility]


   Last Admin: 09/09/18 08:28 Dose:  3 ml


Albuterol/Ipratropium (Duoneb)  3 ml NEB PRN PRN


   PRN Reason:  SOB


Allopurinol (Zyloprim)  150 mg PO DAILY [facility]


   Last Admin: 09/09/18 10:15 Dose:  150 mg


Amoxicillin/Clavulanate Potassium (Augmentin)  875 mg PO Q12HR [facility]


   Last Admin: 09/09/18 10:13 Dose:  875 mg


Atorvastatin Calcium (Lipitor)  40 mg PO HS [facility]


   Last Admin: 09/08/18 21:07 Dose:  40 mg


Benzonatate (Tessalon)  100 mg PO TID [facility]


   Last Admin: 09/09/18 10:14 Dose:  100 mg


[facility]


   Last Admin: 09/09/18 10:10 Dose:  6.25 mg


Digoxin (Lanoxin)  0.125 mg PO MoWeFrSa@0900 [facility]


   Last Admin: 09/08/18 09:04 Dose:  0.125 mg


Docusate Sodium (Colace Liquid)  100 mg PO BID [facility]


   Last Admin: 09/09/18 10:11 Dose:  100 mg


Fluticasone Propionate (Flonase Nasal Spray)  0 gm NASAL DAILY [facility]


   Last Admin: 09/09/18 10:18 Dose:  1 spray


Furosemide (Lasix)  40 mg SLOW IVP 0600,1400 [facility]


   Last Admin: 09/09/18 06:35 Dose:  40 mg


Gabapentin (Neurontin)  300 mg PO BID [facility]


   Last Admin: 09/09/18 10:10 Dose:  300 mg


Guaifenesin/Dextromethorphan (Robitussin Dm)  15 ml PO Q4HR [facility]


   Last Admin: 09/09/18 10:11 Dose:  15 ml


Montelukast Sodium (Singulair)  10 mg PO QPM [facility]


   Last Admin: 09/08/18 21:07 Dose:  10 mg


Prednisone (Prednisone)  20 mg PO BID-[facility]


   Last Admin: 09/09/18 10:10 Dose:  20 mg


Sacubitril/Valsartan (Entresto 49 Mg-51 Mg Tablet)  1 tab PO BID [facility]


   Last Admin: 09/08/18 21:07 Dose:  1 tab


Sodium Chloride (Flush - Normal Saline)  10 ml IVF Q12HR [facility]


   Last Admin: 09/08/18 21:08 Dose:  10 ml


Sodium Chloride (Flush - Normal Saline)  10 ml IVF PRN PRN


   PRN Reason: Saline Flush


   Last Admin: 09/06/18 09:48 Dose:  10 ml


Tamsulosin HCl (Flomax)  0.4 mg PO BID [facility]


   Last Admin: 09/09/18 10:10 Dose:  0.4 mg

## 2018-09-10 VITALS — SYSTOLIC BLOOD PRESSURE: 148 MMHG | TEMPERATURE: 97.8 F | DIASTOLIC BLOOD PRESSURE: 58 MMHG

## 2018-09-10 LAB
ANION GAP SERPL CALC-SCNC: 9 MMOL/L (ref 10–20)
BUN SERPL-MCNC: 42 MG/DL (ref 8.4–25.7)
CALCIUM SERPL-MCNC: 9.1 MG/DL (ref 7.8–10.44)
CHLORIDE SERPL-SCNC: 96 MMOL/L (ref 98–107)
CO2 SERPL-SCNC: 35 MMOL/L (ref 23–31)
CREAT CL PREDICTED SERPL C-G-VRATE: 88 ML/MIN (ref 70–130)
GLUCOSE SERPL-MCNC: 152 MG/DL (ref 83–110)
HGB BLD-MCNC: 11.8 G/DL (ref 14–18)
MCH RBC QN AUTO: 30.8 PG (ref 27–31)
MCV RBC AUTO: 94.7 FL (ref 78–98)
MDIFF COMPLETE?: YES
PLATELET # BLD AUTO: 108 THOU/UL (ref 130–400)
PLATELET BLD QL SMEAR: (no result)
POTASSIUM SERPL-SCNC: 3.6 MMOL/L (ref 3.5–5.1)
RBC # BLD AUTO: 3.83 MILL/UL (ref 4.7–6.1)
SODIUM SERPL-SCNC: 136 MMOL/L (ref 136–145)
WBC # BLD AUTO: 17.6 THOU/UL (ref 4.8–10.8)

## 2018-09-10 RX ADMIN — GUAIFENESIN AND DEXTROMETHORPHAN SCH ML: 100; 10 SYRUP ORAL at 06:02

## 2018-09-10 RX ADMIN — GUAIFENESIN AND DEXTROMETHORPHAN SCH ML: 100; 10 SYRUP ORAL at 16:40

## 2018-09-10 RX ADMIN — GUAIFENESIN AND DEXTROMETHORPHAN SCH ML: 100; 10 SYRUP ORAL at 13:09

## 2018-09-10 RX ADMIN — GUAIFENESIN AND DEXTROMETHORPHAN SCH: 100; 10 SYRUP ORAL at 04:47

## 2018-09-10 RX ADMIN — SACUBITRIL AND VALSARTAN SCH TAB: 49; 51 TABLET, FILM COATED ORAL at 09:57

## 2018-09-10 RX ADMIN — GUAIFENESIN AND DEXTROMETHORPHAN SCH ML: 100; 10 SYRUP ORAL at 09:59

## 2018-09-10 RX ADMIN — Medication SCH ML: at 09:58

## 2018-09-10 RX ADMIN — AMOXICILLIN AND CLAVULANATE POTASSIUM SCH MG: 875; 125 TABLET, FILM COATED ORAL at 09:56

## 2018-09-10 NOTE — PDOC.PN
- Subjective


Encounter Start Date: 09/10/18


Encounter Start Time: 07:50





pt still has cough with hemoptysis, no fever, very weak





- Objective


Resuscitation Status: 


 











Resuscitation Status           DNR:Do Not Resuscitate














MAR Reviewed: Yes


Vital Signs & Weight: 


 Vital Signs (12 hours)











  Temp Pulse Resp BP BP Pulse Ox


 


 09/10/18 10:17   87  20   


 


 09/10/18 09:56   87    


 


 09/10/18 09:55     116/78  


 


 09/10/18 08:05  97.4 F L  87  20    96


 


 09/10/18 07:59  97.4 F L  80  20   116/78  96


 


 09/10/18 07:50   79  20   


 


 09/10/18 04:00  97.3 F L  88  16   108/70  95


 


 09/10/18 03:57       92 L


 


 09/10/18 00:14       93 L








 Weight











Admit Weight                   205 lb


 


Weight                         209 lb














I&O: 


 











 09/09/18 09/10/18 09/11/18





 06:59 06:59 06:59


 


Intake Total 1815 1680 


 


Output Total 1550 1175 


 


Balance 265 505 











Result Diagrams: 


 09/10/18 05:12





 09/10/18 05:12


EKG Reviewed by me: Yes





Phys Exam





- Physical Examination


Constitutional: NAD


HEENT: PERRLA, moist MMs, sclera anicteric


Neck: no JVD, supple


Respiratory: no wheezing, no rales, no rhonchi


Cardiovascular: no significant murmur, irregular


Gastrointestinal: soft, non-tender, no distention, positive bowel sounds


Musculoskeletal: no edema, pulses present


Neurological: non-focal, normal sensation


Lymphatic: no nodes


Psychiatric: normal affect, A&O x 3


Skin: no rash, normal turgor





Dx/Plan


(1) Abscess of right lung with pneumonia


Code(s): J85.1 - ABSCESS OF LUNG WITH PNEUMONIA   Status: Acute   





(2) COPD exacerbation


Code(s): J44.1 - CHRONIC OBSTRUCTIVE PULMONARY DISEASE W (ACUTE) EXACERBATION   

Status: Acute   Comment:     





(3) CKD (chronic kidney disease) stage 3, GFR 30-59 ml/min


Code(s): N18.3 - CHRONIC KIDNEY DISEASE, STAGE 3 (MODERATE)   Status: Chronic   





(4) Chronic atrial fibrillation


Code(s): I48.2 - CHRONIC ATRIAL FIBRILLATION   Status: Chronic   





(5) Chronic systolic heart failure, ACC/AHA stage C


Code(s): I50.22 - CHRONIC SYSTOLIC (CONGESTIVE) HEART FAILURE   Status: Chronic

   





(6) Dyslipidemia


Code(s): E78.5 - HYPERLIPIDEMIA, UNSPECIFIED   Status: Chronic   





(7) HTN (hypertension)


Code(s): I10 - ESSENTIAL (PRIMARY) HYPERTENSION   Status: Chronic   


Qualifiers: 


   Hypertension type: essential hypertension   Qualified Code(s): I10 - 

Essential (primary) hypertension   


Comment: Hold HTN meds





   





(8) Physical deconditioning


Code(s): R53.81 - OTHER MALAISE   Status: Chronic   Comment: Plan for SNF 

placmenet on monday,.   





- Plan


cont current plan of care, continue antibiotics, PT/OT, , 

respiratory therapy





* change lasix PO


* currently on oral augmentin and oral steroid


* still has high wbc elevated and has hemoptysis


* will need placement


* pulmonary following, further plan will defer to them


* medication reviewed as below


* symptomatic treatment.








Review of Systems





- Review of Systems


Constitutional: weakness.  negative: fever, chills, sweats, malaise, other


Respiratory: Cough, Hemoptysis.  negative: Dry, Shortness of Breath, SOB with 

Excertion, Pleuritic Pain, Sputum, Wheezing


Cardiovascular: negative: chest pain, palpitations, orthopnea, paroxysmal 

nocturnal dyspnea, edema, light headedness, other


Gastrointestinal: negative: Nausea, Vomiting, Abdominal Pain, Diarrhea, 

Constipation, Melena, Hematochezia, Other


Genitourinary: negative: Dysuria, Frequency, Incontinence, Hematuria, Retention

, Other


Musculoskeletal: negative: Neck Pain, Shoulder Pain, Arm Pain, Back Pain, Hand 

Pain, Leg Pain, Foot Pain, Other





- Medications/Allergies


Allergies/Adverse Reactions: 


 Allergies











Allergy/AdvReac Type Severity Reaction Status Date / Time


 


No Known Drug Allergies Allergy Unknown  Verified 01/23/18 22:50











Medications: 


 Current Medications





Albuterol Sulfate (Ventolin)  2.5 mg NEB [id number] PRN


   PRN Reason: SOB &/or Wheezing


Albuterol/Ipratropium (Duoneb)  3 ml NEB [id number] [facility]


   Last Admin: 09/10/18 10:17 Dose:  3 ml


Albuterol/Ipratropium (Duoneb)  3 ml NEB PRN PRN


   PRN Reason:  SOB


Allopurinol (Zyloprim)  150 mg PO DAILY [facility]


   Last Admin: 09/10/18 09:56 Dose:  150 mg


Amoxicillin/Clavulanate Potassium (Augmentin)  875 mg PO Q12HR [facility]


   Last Admin: 09/10/18 09:56 Dose:  875 mg


Atorvastatin Calcium (Lipitor)  40 mg PO HS [facility]


   Last Admin: 09/09/18 23:13 Dose:  40 mg


Benzonatate (Tessalon)  100 mg PO TID [facility]


   Last Admin: 09/10/18 09:56 Dose:  Not Given


Carvedilol (Coreg)  6.25 mg PO BID-[facility]


   Last Admin: 09/10/18 09:55 Dose:  6.25 mg


Digoxin (Lanoxin)  0.125 mg PO [id number]@0900 [facility]


   Last Admin: 09/10/18 09:56 Dose:  0.125 mg


Docusate Sodium (Colace Liquid)  100 mg PO BID [facility]


   Last Admin: 09/10/18 09:57 Dose:  100 mg


Fluticasone Propionate (Flonase Nasal Spray)  0 gm NASAL DAILY [facility]


   Last Admin: 09/10/18 09:57 Dose:  1 spray


Furosemide (Lasix)  40 mg SLOW IVP DAILY [facility]


   Last Admin: 09/10/18 09:58 Dose:  40 mg


Gabapentin (Neurontin)  300 mg PO BID [facility]


   Last Admin: 09/10/18 09:56 Dose:  300 mg


Guaifenesin/Dextromethorphan (Robitussin Dm)  15 ml PO Q4HR [facility]


   Last Admin: 09/10/18 09:59 Dose:  15 ml


Montelukast Sodium (Singulair)  10 mg PO QPM [facility]


   Last Admin: 09/09/18 23:12 Dose:  10 mg


Prednisone (Prednisone)  20 mg PO BID-[facility]


   Last Admin: 09/10/18 09:56 Dose:  20 mg


Sacubitril/Valsartan (Entresto 49 Mg-51 Mg Tablet)  1 tab PO BID [facility]


   Last Admin: 09/10/18 09:57 Dose:  1 tab


Sodium Chloride (Flush - Normal Saline)  10 ml IVF Q12HR [facility]


   Last Admin: 09/10/18 09:58 Dose:  10 ml


Sodium Chloride (Flush - Normal Saline)  10 ml IVF PRN PRN


   PRN Reason: Saline Flush


   Last Admin: 09/06/18 09:48 Dose:  10 ml


Tamsulosin HCl (Flomax)  0.4 mg PO BID [facility]


   Last Admin: 09/10/18 09:56 Dose:  0.4 mg

## 2018-09-10 NOTE — DIS
PRIMARY CARE PHYSICIAN:  Dr. Quirino Allen.

 

DATE OF ADMISSION:  09/01/2018

 

DATE OF DISCHARGE:  09/10/2018

 

DISCHARGE DISPOSITION:  [facility] Home.

 

PRIMARY DISCHARGE DIAGNOSES:

1.  Abscess of right lung with hemorrhage.

2.  Chronic obstructive pulmonary disease exacerbation.

 

SECONDARY DISCHARGE DIAGNOSES:  Hypertension, dyslipidemia, chronic kidney disease stage 3, chronic s
ystolic heart failure stage C, chronic kidney disease stage 3, chronic atrial fibrillation, chronic o
bstructive pulmonary disease, physical deconditioning.

 

PRIMARY PROCEDURE/OPERATION:  None.

 

RADIOLOGICAL INVESTIGATION:  Chest x-ray, CT chest, and several other chest x-ray.

 

SIGNIFICANT LABORATORY DATA:  WBC 17.6, hemoglobin 11.8, platelet 108.  INR 1.1.  Sodium 136, potassi
um 3.6, BUN 42, creatinine 0.87, calcium 9.1.  Blood culture negative.

 

DISCHARGE MEDICATIONS:  Ventolin nebulization q.4 hourly, allopurinol 300 mg p.o. daily, Lipitor 80 m
g p.o. daily, meclizine 25 mg p.o. t.i.d. p.r.n., Singulair 10 mg p.o. daily, Entresto 1 tablet p.o. 
b.i.d., Flomax 0.4 mg p.o. b.i.d., Augmentin 875 mg twice daily for 15 days, Coreg 6.25 mg p.o. b.i.d
., digoxin 0.125 mg p.o. Monday, Wednesday, Friday, and Saturday, Flonase nasal spray daily, Lasix 40
 mg p.o. daily, DuoNeb q.6 hourly, prednisone 20 mg p.o. b.i.d. for 5 days, Florastor 250 mg p.o. grupo
ly.

 

CONTRAINDICATIONS:  None.

 

CODE STATUS:  FULL CODE.

 

INPATIENT CONSULTANTS:  Dr. Cevallos was following while in hospital.  Dr. Lima was following while in 
ostal.

 

TEST RESULTS PENDING ON DISCHARGE:  None.

 

ALLERGIES:  No known drug allergy.

 

DISCHARGE PLAN:  Post hospital, the patient will follow up with Dr. Cevallos as instructed.

 

HOSPITAL COURSE:  The patient is an 83-year-old male, who was admitted by Dr. Julia Jaramillo.  Please se
e her H&P for further details.  The patient was having a cough, hemoptysis that is why he was admitte
d, Dr. Cevallos was consulted and Dr. Cevallos was thinking that this patient has a right upper lobe absces
s with hemorrhage.  Because of his ongoing hemoptysis, we have to discontinue his chronic anticoagula
tion while in hospital as well as upon discharge.  His white count is still high, but he is afebrile.
  He is clinically doing okay.  He still has intermittent mild hemoptysis.  At this point, Pulmonary 
Group does not have any new recommendation.  Dr. Cevallos cleared him for discharge.  Patient is not giv
en anticoagulant therapy because of his intermittent hemoptysis.  The patient will follow up with the
 pulmonologist for repeat x-ray and imaging and further plan if needed.

 

The patient is approved for Skilled Nursing Home for his physical deconditioning.  Paper work for dis
charge done.  Discharge medication reconciliation done.

 

Total time spent on discharge day 31 minutes.

## 2018-09-26 ENCOUNTER — HOSPITAL ENCOUNTER (OUTPATIENT)
Dept: HOSPITAL 92 - RAD | Age: 83
Discharge: HOME | End: 2018-09-26
Attending: INTERNAL MEDICINE
Payer: MEDICARE

## 2018-09-26 DIAGNOSIS — R91.8: ICD-10-CM

## 2018-09-26 DIAGNOSIS — J98.6: ICD-10-CM

## 2018-09-26 DIAGNOSIS — R06.00: Primary | ICD-10-CM

## 2018-09-26 PROCEDURE — 71046 X-RAY EXAM CHEST 2 VIEWS: CPT

## 2018-09-26 NOTE — RAD
PA AND LATERAL CHEST:

 

Date:  09/26/18 

 

HISTORY:  

Dyspnea. 

 

COMPARISON:  

09/21/18. 

 

FINDINGS:

Postsurgical changes right hilar region, as well as in the left paramediastinal location are again se
en with circular metallic density related to prior postsurgical changes overlying the region of the a
ortic knob. Previously noted area of opacification within the paramediastinal location in the right m
id and upper lung zones is again seen and unchanged compared to prior studies. There are pleural and 
parenchymal changes of the right lung base, some of which is related to elevation of the right hemidi
aphragm, but developing right pleural effusion, atelectasis, or possibly developing pneumonia at the 
right lung base is a possibility. The left lung remains clear. Cardiac silhouette and pulmonary vascu
lature are within normal limits. [place] screws again overlie the right humeral head. No other interva
l change. 

 

IMPRESSION: 

1.  Elevation of right hemidiaphragm, but there has been interval increase in pleural and parenchymal
 changes at the right lung base which may be related to interval development of right pleural effusio
n and atelectasis versus superimposed pneumonia. Follow-up to resolution is recommended. 

 

2.  Persistent opacity in the right perihilar location, which is in the right mid and upper lung zone
s. 

 

 

 

POS: [facility]

## 2018-10-25 ENCOUNTER — HOSPITAL ENCOUNTER (OUTPATIENT)
Dept: HOSPITAL 92 - RAD | Age: 83
Discharge: HOME | End: 2018-10-25
Attending: INTERNAL MEDICINE
Payer: MEDICARE

## 2018-10-25 DIAGNOSIS — J44.9: ICD-10-CM

## 2018-10-25 DIAGNOSIS — I70.0: ICD-10-CM

## 2018-10-25 DIAGNOSIS — Z98.890: ICD-10-CM

## 2018-10-25 DIAGNOSIS — R06.00: Primary | ICD-10-CM

## 2018-10-25 PROCEDURE — 71046 X-RAY EXAM CHEST 2 VIEWS: CPT

## 2018-10-25 NOTE — RAD
CHEST TWO VIEWS:

 

History: Dyspnea. 

 

Comparison: 9-26-18

 

FINDINGS: 

Cardiac silhouette is upper limits of normal in size. Lungs remain hyperinflated. Post-operative candelario
ges at the right hilum and mass density at the right infrahilar level, unchanged. Parenchymal opacity
 at the right base and blunting of each costophrenic angle are stable. Mediastinum is midline with po
st-operative changes and aortic calcification. 

 

Prominent degenerative changes of the thoracic spine. Post-operative changes of the right shoulder. 

 

IMPRESSION: 

1. Post-operative changes, COPD. Chronic type findings are stable. 

2. Atherosclerosis.

 

POS: [facility]

## 2018-11-29 ENCOUNTER — HOSPITAL ENCOUNTER (OUTPATIENT)
Dept: HOSPITAL 92 - RAD | Age: 83
Discharge: HOME | End: 2018-11-29
Attending: INTERNAL MEDICINE
Payer: MEDICARE

## 2018-11-29 DIAGNOSIS — J90: ICD-10-CM

## 2018-11-29 DIAGNOSIS — R06.00: Primary | ICD-10-CM

## 2018-11-29 PROCEDURE — 71046 X-RAY EXAM CHEST 2 VIEWS: CPT

## 2018-11-29 NOTE — RAD
TWO VIEWS CHEST:

 

Comparison: 10-25-18

 

History: Dyspnea. 

 

FINDINGS: 

Two views of the chest shows an enlarged but stable cardiomediastinal silhouette. Post-surgical brown
es are seen in the aorta. Surgical clips are seen in the right hilar region. Opacity seen in the righ
t lung base which may represent a layering pleural effusion. These findings are unchanged compared to
 the prior exam. Degenerative changes are seen in the spine. 

 

IMPRESSION: 

Stable right pleural effusion.

 

POS: [facility]

## 2019-06-19 ENCOUNTER — HOSPITAL ENCOUNTER (EMERGENCY)
Dept: HOSPITAL 92 - SCSER | Age: 84
Discharge: HOME | End: 2019-06-19
Payer: MEDICARE

## 2019-06-19 DIAGNOSIS — S35.09XA: Primary | ICD-10-CM

## 2019-06-19 DIAGNOSIS — M54.5: ICD-10-CM

## 2019-06-19 DIAGNOSIS — J44.9: ICD-10-CM

## 2019-06-19 DIAGNOSIS — I48.91: ICD-10-CM

## 2019-06-19 DIAGNOSIS — Z87.891: ICD-10-CM
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
## 2019-06-19 DIAGNOSIS — I50.9: ICD-10-CM

## 2019-06-19 DIAGNOSIS — I11.0: ICD-10-CM

## 2019-06-19 PROCEDURE — 72100 X-RAY EXAM L-S SPINE 2/3 VWS: CPT

## 2019-07-03 ENCOUNTER — HOSPITAL ENCOUNTER (INPATIENT)
Dept: HOSPITAL 92 - ERS | Age: 84
LOS: 2 days | Discharge: HOME | DRG: 190 | End: 2019-07-05
Attending: INTERNAL MEDICINE | Admitting: INTERNAL MEDICINE
Payer: MEDICARE

## 2019-07-03 VITALS — BODY MASS INDEX: 26.7 KG/M2

## 2019-07-03 DIAGNOSIS — I73.9: ICD-10-CM

## 2019-07-03 DIAGNOSIS — J96.21: ICD-10-CM

## 2019-07-03 DIAGNOSIS — I27.20: ICD-10-CM

## 2019-07-03 DIAGNOSIS — I13.0: ICD-10-CM

## 2019-07-03 DIAGNOSIS — Z99.81: ICD-10-CM

## 2019-07-03 DIAGNOSIS — Z87.891: ICD-10-CM

## 2019-07-03 DIAGNOSIS — R79.89: ICD-10-CM

## 2019-07-03 DIAGNOSIS — Z79.82: ICD-10-CM

## 2019-07-03 DIAGNOSIS — J44.1: Primary | ICD-10-CM

## 2019-07-03 DIAGNOSIS — E78.5: ICD-10-CM

## 2019-07-03 DIAGNOSIS — M81.0: ICD-10-CM

## 2019-07-03 DIAGNOSIS — M10.9: ICD-10-CM

## 2019-07-03 DIAGNOSIS — Z79.01: ICD-10-CM

## 2019-07-03 DIAGNOSIS — I50.42: ICD-10-CM

## 2019-07-03 DIAGNOSIS — Z66: ICD-10-CM

## 2019-07-03 DIAGNOSIS — I48.2: ICD-10-CM

## 2019-07-03 DIAGNOSIS — Z79.51: ICD-10-CM

## 2019-07-03 DIAGNOSIS — N18.3: ICD-10-CM

## 2019-07-03 DIAGNOSIS — Z79.899: ICD-10-CM

## 2019-07-03 DIAGNOSIS — I25.10: ICD-10-CM

## 2019-07-03 DIAGNOSIS — N40.0: ICD-10-CM

## 2019-07-03 LAB
ALBUMIN SERPL BCG-MCNC: 4.1 G/DL (ref 3.4–4.8)
ALP SERPL-CCNC: 87 U/L (ref 40–150)
ALT SERPL W P-5'-P-CCNC: 23 U/L (ref 8–55)
ANION GAP SERPL CALC-SCNC: 14 MMOL/L (ref 10–20)
AST SERPL-CCNC: 23 U/L (ref 5–34)
BASOPHILS # BLD AUTO: 0 THOU/UL (ref 0–0.2)
BASOPHILS NFR BLD AUTO: 0 % (ref 0–1)
BILIRUB SERPL-MCNC: 1.6 MG/DL (ref 0.2–1.2)
BUN SERPL-MCNC: 29 MG/DL (ref 8.4–25.7)
CALCIUM SERPL-MCNC: 11 MG/DL (ref 7.8–10.44)
CHLORIDE SERPL-SCNC: 99 MMOL/L (ref 98–107)
CO2 SERPL-SCNC: 33 MMOL/L (ref 23–31)
CREAT CL PREDICTED SERPL C-G-VRATE: 0 ML/MIN (ref 70–130)
DIGOXIN SERPL-MCNC: (no result) NG/ML (ref 0.8–2)
EOSINOPHIL # BLD AUTO: 0.1 THOU/UL (ref 0–0.7)
EOSINOPHIL NFR BLD AUTO: 0.5 % (ref 0–10)
GLOBULIN SER CALC-MCNC: 2.9 G/DL (ref 2.4–3.5)
GLUCOSE SERPL-MCNC: 142 MG/DL (ref 83–110)
HGB BLD-MCNC: 15.7 G/DL (ref 14–18)
LYMPHOCYTES # BLD: 0.9 THOU/UL (ref 1.2–3.4)
LYMPHOCYTES NFR BLD AUTO: 4.9 % (ref 21–51)
MCH RBC QN AUTO: 30.6 PG (ref 27–31)
MCV RBC AUTO: 96 FL (ref 78–98)
MONOCYTES # BLD AUTO: 2.2 THOU/UL (ref 0.11–0.59)
MONOCYTES NFR BLD AUTO: 12.1 % (ref 0–10)
NEUTROPHILS # BLD AUTO: 14.8 THOU/UL (ref 1.4–6.5)
NEUTROPHILS NFR BLD AUTO: 82.5 % (ref 42–75)
PLATELET # BLD AUTO: 152 THOU/UL (ref 130–400)
POTASSIUM SERPL-SCNC: 4.2 MMOL/L (ref 3.5–5.1)
RBC # BLD AUTO: 5.15 MILL/UL (ref 4.7–6.1)
SODIUM SERPL-SCNC: 142 MMOL/L (ref 136–145)
WBC # BLD AUTO: 17.9 THOU/UL (ref 4.8–10.8)

## 2019-07-03 PROCEDURE — 83880 ASSAY OF NATRIURETIC PEPTIDE: CPT

## 2019-07-03 PROCEDURE — 87149 DNA/RNA DIRECT PROBE: CPT

## 2019-07-03 PROCEDURE — 80048 BASIC METABOLIC PNL TOTAL CA: CPT

## 2019-07-03 PROCEDURE — 93005 ELECTROCARDIOGRAM TRACING: CPT

## 2019-07-03 PROCEDURE — 85025 COMPLETE CBC W/AUTO DIFF WBC: CPT

## 2019-07-03 PROCEDURE — 84484 ASSAY OF TROPONIN QUANT: CPT

## 2019-07-03 PROCEDURE — 87070 CULTURE OTHR SPECIMN AEROBIC: CPT

## 2019-07-03 PROCEDURE — 93306 TTE W/DOPPLER COMPLETE: CPT

## 2019-07-03 PROCEDURE — 80053 COMPREHEN METABOLIC PANEL: CPT

## 2019-07-03 PROCEDURE — 71045 X-RAY EXAM CHEST 1 VIEW: CPT

## 2019-07-03 PROCEDURE — 94640 AIRWAY INHALATION TREATMENT: CPT

## 2019-07-03 PROCEDURE — 87077 CULTURE AEROBIC IDENTIFY: CPT

## 2019-07-03 PROCEDURE — 36415 COLL VENOUS BLD VENIPUNCTURE: CPT

## 2019-07-03 PROCEDURE — 87040 BLOOD CULTURE FOR BACTERIA: CPT

## 2019-07-03 PROCEDURE — 80162 ASSAY OF DIGOXIN TOTAL: CPT

## 2019-07-03 PROCEDURE — 87205 SMEAR GRAM STAIN: CPT

## 2019-07-03 PROCEDURE — 83605 ASSAY OF LACTIC ACID: CPT

## 2019-07-03 RX ADMIN — AMOXICILLIN AND CLAVULANATE POTASSIUM SCH MG: 875; 125 TABLET, FILM COATED ORAL at 22:05

## 2019-07-03 RX ADMIN — DOCUSATE SODIUM 50 MG AND SENNOSIDES 8.6 MG SCH TAB: 8.6; 5 TABLET, FILM COATED ORAL at 22:05

## 2019-07-03 RX ADMIN — SACUBITRIL AND VALSARTAN SCH TAB: 49; 51 TABLET, FILM COATED ORAL at 22:06

## 2019-07-03 NOTE — RAD
PORTABLE CHEST:

7/3/19

 

INDICATION:

Dyspnea. 

 

COMPARISON: 

11/29/18.

 

Postoperative changes are again noted overlying the right hilum and mediastinum. Blunting of the righ
t costophrenic  angle is again noted possibly chronic pleural change.  Interstitial prominence in the
 lung bases appear stable. Upper lung fields are aerated and clear. No evidence of vascular congestio
n. Prominent pulmonary artery segments again noted. Heart size is upper normal and stable. 

 

IMPRESSION: 

Chronic chest findings appear stable as described above. 

 

POS: [facility]

## 2019-07-04 LAB
ANION GAP SERPL CALC-SCNC: 13 MMOL/L (ref 10–20)
BASOPHILS # BLD AUTO: 0 THOU/UL (ref 0–0.2)
BASOPHILS NFR BLD AUTO: 0 % (ref 0–1)
BUN SERPL-MCNC: 27 MG/DL (ref 8.4–25.7)
CALCIUM SERPL-MCNC: 10 MG/DL (ref 7.8–10.44)
CHLORIDE SERPL-SCNC: 102 MMOL/L (ref 98–107)
CO2 SERPL-SCNC: 28 MMOL/L (ref 23–31)
CREAT CL PREDICTED SERPL C-G-VRATE: 65 ML/MIN (ref 70–130)
EOSINOPHIL # BLD AUTO: 0 THOU/UL (ref 0–0.7)
EOSINOPHIL NFR BLD AUTO: 0.1 % (ref 0–10)
GLUCOSE SERPL-MCNC: 166 MG/DL (ref 83–110)
HGB BLD-MCNC: 13.9 G/DL (ref 14–18)
LYMPHOCYTES # BLD: 0.6 THOU/UL (ref 1.2–3.4)
LYMPHOCYTES NFR BLD AUTO: 4 % (ref 21–51)
MCH RBC QN AUTO: 30.6 PG (ref 27–31)
MCV RBC AUTO: 95.5 FL (ref 78–98)
MONOCYTES # BLD AUTO: 0.3 THOU/UL (ref 0.11–0.59)
MONOCYTES NFR BLD AUTO: 1.9 % (ref 0–10)
NEUTROPHILS # BLD AUTO: 13.4 THOU/UL (ref 1.4–6.5)
NEUTROPHILS NFR BLD AUTO: 94.1 % (ref 42–75)
PLATELET # BLD AUTO: 136 THOU/UL (ref 130–400)
POTASSIUM SERPL-SCNC: 3.8 MMOL/L (ref 3.5–5.1)
RBC # BLD AUTO: 4.56 MILL/UL (ref 4.7–6.1)
SODIUM SERPL-SCNC: 139 MMOL/L (ref 136–145)
WBC # BLD AUTO: 14.3 THOU/UL (ref 4.8–10.8)

## 2019-07-04 RX ADMIN — SACUBITRIL AND VALSARTAN SCH TAB: 49; 51 TABLET, FILM COATED ORAL at 22:50

## 2019-07-04 RX ADMIN — SACUBITRIL AND VALSARTAN SCH TAB: 49; 51 TABLET, FILM COATED ORAL at 09:32

## 2019-07-04 RX ADMIN — AMOXICILLIN AND CLAVULANATE POTASSIUM SCH MG: 875; 125 TABLET, FILM COATED ORAL at 22:43

## 2019-07-04 RX ADMIN — DOCUSATE SODIUM 50 MG AND SENNOSIDES 8.6 MG SCH TAB: 8.6; 5 TABLET, FILM COATED ORAL at 09:29

## 2019-07-04 RX ADMIN — DOCUSATE SODIUM 50 MG AND SENNOSIDES 8.6 MG SCH TAB: 8.6; 5 TABLET, FILM COATED ORAL at 22:44

## 2019-07-04 RX ADMIN — AMOXICILLIN AND CLAVULANATE POTASSIUM SCH MG: 875; 125 TABLET, FILM COATED ORAL at 09:28

## 2019-07-04 NOTE — PDOC.PN
- Subjective


Encounter Start Date: 07/04/19


Encounter Start Time: 10:45


Subjective: breathing better, no c/o palp or chest pain





- Objective


Resuscitation Status - Order Detail:





07/03/19 19:25


Resuscitation Status Routine 


   Resuscitation Status: DNAR: NO Resuscitation


   Discussed with: d/w pt in ER #[id number], wife and POA present at bedside








MAR Reviewed: Yes


Vital Signs & Weight: 


 Vital Signs (12 hours)











  Temp Pulse Pulse Pulse Resp BP BP


 


 07/04/19 11:00   94     


 


 07/04/19 10:20    73  72   122/57 L  134/61


 


 07/04/19 08:09   94    16  


 


 07/04/19 08:00  97.5 F L  92    17  


 


 07/04/19 04:00  97.7 F  85    18  














  BP Pulse Ox


 


 07/04/19 11:00  


 


 07/04/19 10:20  


 


 07/04/19 08:09  


 


 07/04/19 08:00  125/58 L  97


 


 07/04/19 04:00  122/58 L  96








 Weight











Weight                         225 lb 8 oz














I&O: 


 











 07/03/19 07/04/19 07/05/19





 06:59 06:59 06:59


 


Intake Total  150 


 


Output Total  200 


 


Balance  -50 











Result Diagrams: 


 07/04/19 05:37





 07/04/19 05:37





Phys Exam





- Physical Examination


HEENT: PERRLA, moist MMs


Neck: no JVD, supple


Respiratory: no rales


rhonchi++


Cardiovascular: no significant murmur, irregular


Gastrointestinal: soft, non-tender, positive bowel sounds


Musculoskeletal: pulses present, edema present


Neurological: non-focal, moves all 4 limbs


Psychiatric: normal affect, A&O x 3





Dx/Plan


(1) Chronic respiratory failure with hypoxia


Code(s): J96.11 - CHRONIC RESPIRATORY FAILURE WITH HYPOXIA   Status: Chronic   

Comment: initial ac resp failure is resolved and is on chronic home oxygen dose

   





(2) COPD exacerbation


Code(s): J44.1 - CHRONIC OBSTRUCTIVE PULMONARY DISEASE W (ACUTE) EXACERBATION   

Status: Acute   Comment:     





(3) Chronic atrial fibrillation


Code(s): I48.2 - CHRONIC ATRIAL FIBRILLATION   Status: Chronic   Comment: 

initial afib with rvr is rate controlled now   





(4) Chronic systolic heart failure, ACC/AHA stage C


Code(s): I50.22 - CHRONIC SYSTOLIC (CONGESTIVE) HEART FAILURE   Status: Chronic

   





(5) Dyslipidemia


Code(s): E78.5 - HYPERLIPIDEMIA, UNSPECIFIED   Status: Chronic   





(6) HTN (hypertension)


Code(s): I10 - ESSENTIAL (PRIMARY) HYPERTENSION   Status: Chronic   


Qualifiers: 


   Hypertension type: essential hypertension   Qualified Code(s): I10 - 

Essential (primary) hypertension   


Comment: 


   





(7) BPH (benign prostatic hyperplasia)


Code(s): N40.0 - BENIGN PROSTATIC HYPERPLASIA WITHOUT LOWER URINRY TRACT SYMP   

Status: Chronic   


Qualifiers: 


   Lower urinary tract symptom presence: symptoms absent   Qualified Code(s): 

N40.0 - Benign prostatic hyperplasia without lower urinary tract symptoms   





- Plan


is on steroids, nebs, augmentin


-: continue eliquis, coreg, digoxin, entresto high dose, flomax and singulair


-: to mobilize as tolerated


-: gentle diuresis, lower edema has come down significantly


-: hemostable, likely dc plan in 24-36hrs





* .








Review of Systems





- Medications/Allergies


Allergies/Adverse Reactions: 


 Allergies











Allergy/AdvReac Type Severity Reaction Status Date / Time


 


No Known Drug Allergies Allergy Unknown  Verified 01/23/18 22:50











Medications: 


 Current Medications





Acetaminophen (Tylenol)  650 mg PO Q4H PRN


   PRN Reason: Headache/Fever/Mild Pain (1-3)


Albuterol/Ipratropium (Duoneb)  3 ml NEB [id number] [facility]


   Last Admin: 07/04/19 08:09 Dose:  3 ml


Allopurinol (Zyloprim)  100 mg PO DAILY [facility]


   Last Admin: 07/04/19 09:30 Dose:  100 mg


Amoxicillin/Clavulanate Potassium (Augmentin)  875 mg PO BID [facility]


   Last Admin: 07/04/19 09:28 Dose:  875 mg


Apixaban (Eliquis)  2.5 mg PO BID [facility]


   Last Admin: 07/04/19 09:29 Dose:  2.5 mg


Atorvastatin Calcium (Lipitor)  80 mg PO DAILY [facility]


   Last Admin: 07/04/19 09:29 Dose:  80 mg


Benzonatate (Tessalon)  100 mg PO TID [facility]


   Last Admin: 07/04/19 09:28 Dose:  100 mg


Bisacodyl (Dulcolax)  10 mg [place] DAILYPRN PRN


   PRN Reason: Constipation


Carvedilol (Coreg)  6.25 mg PO BID-[facility]


   Last Admin: 07/04/19 09:30 Dose:  6.25 mg


Digoxin (Lanoxin)  0.125 mg PO MoWeFrSa@0900 [facility]


Famotidine (Pepcid)  20 mg PO BID [facility]


   Last Admin: 07/04/19 09:30 Dose:  20 mg


Furosemide (Lasix)  40 mg PO DAILY [facility]


   Last Admin: 07/04/19 09:31 Dose:  40 mg


Guaifenesin (Mucinex)  600 mg PO Q12HR [facility]


   Last Admin: 07/04/19 09:30 Dose:  600 mg


Guaifenesin/Dextromethorphan (Robitussin Dm)  15 ml PO Q4H PRN


   PRN Reason: Cough


Meclizine HCl (Antivert)  25 mg PO TID PRN


   PRN Reason: Dizziness


Methylprednisolone Sodium Succinate (Solu-Medrol)  40 mg IVP Q6HR [facility]


   Last Admin: 07/04/19 12:35 Dose:  40 mg


Montelukast Sodium (Singulair)  10 mg PO HS [facility]


   Last Admin: 07/03/19 22:05 Dose:  10 mg


Saccharomyces Boulardii (Florastor)  250 mg PO DAILY [facility]


   Last Admin: 07/04/19 09:28 Dose:  250 mg


Sacubitril/Valsartan (Entresto 49 Mg-51 Mg Tablet)  2 tab PO BID [facility]


   Last Admin: 07/04/19 09:32 Dose:  2 tab


Senna/Docusate Sodium (Senokot S)  2 tab PO BID [facility]


   Last Admin: 07/04/19 09:29 Dose:  2 tab


Tamsulosin HCl (Flomax)  0.4 mg PO BID [facility]


   Last Admin: 07/04/19 09:30 Dose:  0.4 mg

## 2019-07-04 NOTE — CON
DATE OF CONSULTATION:  



HISTORY OF PRESENT ILLNESS:  Obie Tomas is an 84-year-old gentleman, who

sees Dr. Cevallos in our office, presented with increasing shortness of breath and

cough without any associated fevers, chills, sweats, or hemoptysis.  He said his

oxygen saturation dropped yesterday.  He said his breathing is a combination of back

pain and knee pain that aggravates his breathing. 



He has also noticed some increased lower extremity swelling and felt weak. 



He has severe limitation to his activity.  He can barely walk a couple of 100 feet

without getting extremely dyspneic. 



PAST MEDICAL HISTORY:  Congestive heart failure, atrial fibrillation, hypertension,

and COPD. 



PREVIOUS SURGERIES:  Hernia surgery, back surgery, upper lobe lobectomy, abdominal

aortic aneurysm repair, and tendon repair. 



SOCIAL HISTORY:  Former smoker, quit smoking in 1988.  Alcohol, minimal.



HOME MEDICATIONS:  Include,

1. Allopurinol 300.

2. Eliquis 2.5.

3. Aspirin.

4. Coreg 6.25.

5. Digoxin 125.

6. Nebulizer.

7. Meclizine.

8. Singulair 10.

9. Flomax 0.4.

10. Entresto.

11. __________.

12. Guaifenesin. 

Since admission, scheduled neb treatments, steroids, to which he said he is feeling

better. 



REVIEW OF SYSTEMS:  Ten-point negative.



PHYSICAL EXAMINATION:

VITAL SIGNS:  His O2 saturation is 97% on 3 L, pulse 94, blood pressure __________,

respiratory rate 18. 

CHEST:  Bilateral crackles. 

CARDIAC:  Normal S1 and S2.  No gallops. 

ABDOMEN:  Soft. 

NEUROLOGIC:  Unremarkable.



LABORATORY DATA:  Creatinine is 1.25, baseline.  His BNP is 357.  X-ray shows

bilateral increased markings, CHF, pleural effusion.  White count 14,000. 



IMPRESSION:  Respiratory failure, congestive heart failure, chronic obstructive

pulmonary disease, severe deconditioning, advanced age, azotemia. 



I agree with present treatment.  Dr. Cevallos sees him regularly.  Probably switch him

over to oral prednisone tomorrow.  PT supportive care, we will follow. 



Consultation note, 70 minutes, 50% direct patient care.







Job ID:  [id number]

## 2019-07-04 NOTE — CON
DATE OF CONSULTATION:  07/04/2019



INDICATION FOR CONSULTATION AND HISTORY OF PRESENT ILLNESS:  An 84-year-old DNR

patient with a history of congestive heart failure, both systolic and diastolic,

with severe decrease in left ventricular systolic function, who has had also lung

cancer with partial lung removal, presented with increasing shortness of breath and

we were advised to see him.  His BNP is only mildly elevated at 353.  He does not

appear to have any significant lower extremity edema.  Chest x-ray showed a very

small right pleural effusion, but no acute evidence of any major congestive heart

failure or pulmonary edema.  He does have enlarged pulmonary arteries compatible

with most likely pulmonary hypertension.  At this time, he is comfortable.  He

denies any shortness of breath.  He has not had his oxygen on.  Apparently, he has

been somewhat noncompliant with medications at home.  He confuses in the morning

with the night medications and sometimes he did not take his medicines routinely.

He presented complaining of increasing shortness of breath.  He has been coughing,

does not have any fever.  He says he has had history of pneumonias in the past, but

this time does not appear to have any significant pneumonia on the chest x-ray.  He

denied chest pain.  He mainly complained of shortness of breath. 



PAST MEDICAL HISTORY:  Significant for;

1. Coronary artery disease.

2. History of chronic atrial fibrillation.

3. History of pneumonias.

4. Back surgery x3.

5. He had history of aortic aneurysm repair.

6. He had lung cancer.

7. He has a history of osteoporosis.

8. History of COPD.

9. Hypertension.

10. Dyslipidemia.



SOCIAL HISTORY:  History of tobacco abuse, he stopped in 1988, but previously he

smoked up to 3 packs a day for more than 50 years.  He has no alcohol use. No other

significant other social abnormalities. 



FAMILY HISTORY:  Both of his parents had heart disease.  Mother had congestive heart

failure.  Father had atrial fibrillation.  He has had some siblings with CVAs. 



ALLERGIES:  NONE.



MEDICATIONS:  Major medications, he was on;

1. Digoxin 0.125 mg daily.

2. Carvedilol 6.25 mg b.i.d.

3. Aspirin 81 mg a day.

4. Torsemide 100 mg once a day.

5. Entresto 97/103 twice a day.

6. Meclizine.

7. Milk of magnesia.

8. Nasal spray.

9. Ginkgo biloba.

10. PreserVision.

11. Topical lidocaine cream.

12. Multivitamins.

13. Dulcolax suppositories  __________.

14. Mucinex.

15. Vitamin D3.

16. Potassium chloride 20 mEq.

17. Eliquis 2.5 mg a day.

18. Atorvastatin 80 mg a day.

19. Flomax 0.4 mg once a day.

20. Montelukast.

21. Zyrtec.

22. Allopurinol 100 mg once a day.

23. Phenazopyridine 100 mg 2 tablets after meals 3 times a day.



REVIEW OF SYSTEMS:  He says he has some left eye macular degeneration.  He complains

of shortness of breath, dyspnea on exertion, also constipation.  He has some

peripheral neuropathy. Otherwise, he denied any complaints on 12-point review of

systems. 



PHYSICAL EXAMINATION:

GENERAL:  Reveals an elderly gentleman.  He is in no acute distress at this time.

He does not have his oxygen on. 

VITAL SIGNS:  Blood pressure is 122/58.  He is afebrile.  Heart rates in the 80s to

90s, shows irregular rhythm with atrial fibrillation, respiratory rate 16 to 20, O2

saturation is 96% on room air. 

HEENT:  Shows head to be normocephalic and atraumatic. Carotid pulses are present.

I did not hear any bruits. 

CHEST: He has some expiratory wheezing.  Breath sounds are somewhat decreased

throughout.  I did not hear any rhonchi or rales. 

CARDIOVASCULAR:  Reveals an irregularly irregular rhythm.  He has a soft systolic

murmur at the apex. 

ABDOMEN:  Soft and nontender.  Positive bowel sounds are present. 

EXTREMITIES: Showed no clubbing, cyanosis, or edema.  I could barely palpate femoral

pulses, I could not hear bruit, but I could not palpate popliteal or pedal pulses,

but he denies claudication. 

NEUROLOGIC:  The patient appears to be intact for someone of his age.  He has no

other significant gross focal motor deficits.  He is able to sit in the bed alone.

He is able to get up from lying to sitting position. 

SKIN:  Warm and dry.



LABORATORY DATA:  Showed BNP of 353, which is mildly elevated. Cardiac enzymes are

negative.  BUN is 27, creatinine is 1.24, potassium is 3.8, blood sugar is 166.

Hemoglobin 13.9, hematocrit 43.6, WBC of 14.3, and platelet count of 136,000.  He

did tell me that he was not a diabetic, but blood sugars do appear to be slightly

elevated and  __________ his other admissions. 



EKG shows atrial fibrillation, but no acute changes. 



Chest x-ray had no acute findings with a small right pleural effusion, which was

noted.  He has clips from previous surgeries. 



IMPRESSION AND PLAN:  Chronic obstructive pulmonary disease exacerbation with

possible small congestive heart failure component.  We will ask for an

echocardiogram in this gentleman if he has not already had one ordered.  His last

echocardiogram was in 2017, that I could see. At that time, he has severe left

atrial and right atrial dilatation, left ventricular dilatation.  He did have

moderate elevation of right ventricular systolic pressure.  He had mild-to-moderate

mitral valve regurgitation.  At this time, I would continue his medications with

diuretics.  Would resume his Entresto, although he has already been placed back on

that, but will increase the dose.  I believe he was taking a higher dose at home.

According to my records, he was taking Entresto at a higher dose than what he has

been written for here.  We will increase the dose of his medications to 97/103.

This is a good medicine for him.  Apparently, he has had less admissions after being

started on this medication for his congestive heart failure.  Further care of the

patient will be by Dr. Calle when he visits with the patient tomorrow, but at this

time, he appears to be overall relatively stable from a Cardiac standpoint. 

1. Chronic obstructive pulmonary disease exacerbation.  He seems to be doing much

better now.  He is without his oxygen. O2 saturations are stable.  Chest x-ray does

not indicate significant congestive heart failure, but he does have diastolic and

systolic heart failure as noted above with decrease in left ventricular systolic

function.  We will repeat the echocardiogram.  We will continue his Entresto, also

diuretics. 

2. Atrial fibrillation.  The rate is slightly elevated at times.  We will make sure

the patient is on his digoxin.  His digoxin level was low.  We will resume his

medications.  He is on Coreg 6.25 mg b.i.d. We could try to increase his medication

to lower the heart rate.  However, he may become hypotensive. 

3. History of peripheral vascular disease.  He is asymptomatic at this time.  He is

able to walk without claudication type symptoms. 

4. History of chronic kidney disease.  At this time, the renal function appears to

be stable with a creatinine of 1.24, BUN of 26.  Further care of the patient will be

by Dr. Calle when he visits with the patient tomorrow. 







Job ID:  [id number]

## 2019-07-05 VITALS — SYSTOLIC BLOOD PRESSURE: 132 MMHG | DIASTOLIC BLOOD PRESSURE: 67 MMHG

## 2019-07-05 VITALS — TEMPERATURE: 97.6 F

## 2019-07-05 RX ADMIN — AMOXICILLIN AND CLAVULANATE POTASSIUM SCH MG: 875; 125 TABLET, FILM COATED ORAL at 08:53

## 2019-07-05 RX ADMIN — DOCUSATE SODIUM 50 MG AND SENNOSIDES 8.6 MG SCH TAB: 8.6; 5 TABLET, FILM COATED ORAL at 08:53

## 2019-07-05 RX ADMIN — SACUBITRIL AND VALSARTAN SCH TAB: 49; 51 TABLET, FILM COATED ORAL at 08:54

## 2019-07-05 NOTE — PRG
DATE OF SERVICE:  07/05/2019



SUBJECTIVE:  Mr. Tomas is doing well, no complaints.  Breathing well now.



OBJECTIVE:  VITAL SIGNS:  Blood pressure 132/62, pulse 70s and irregularly

irregular. 

LUNGS:  Clear anteriorly and posteriorly. 

CARDIAC:  Irregularly irregular.  No new murmur, rub, or gallop. 

ABDOMEN:  Soft, nontender. 

EXTREMITIES:  No edema.



DIAGNOSTIC DATA:  Echocardiogram shows ejection fraction approximately 40% to 45%.

Chest x-ray really did not show pulmonary vascular congestion on the 3rd. 



ASSESSMENT:  

1. Chronic obstructive pulmonary disease.

2. History of congestive heart failure appears stable.

3. Chronic atrial fibrillation.



PLAN:  Continue current medical regimen.  Okay for me to be released home.







Job ID:  [id number]

## 2019-07-05 NOTE — PRG
DATE OF SERVICE:  07/05/2019



SUBJECTIVE:  Events have been reviewed.  He is tentatively on the schedule to go

home.  His vital signs have been stable.  He is not wheezing.  He walked down the

meadows and back today without any dyspnea, he said.  Coming into the hospital, he

could walk from bathroom to his bedroom, he said. 



OBJECTIVE:  LUNGS:  Clear. 

HEART:  Regular rhythm, S1, S2 are normal. 

ABDOMEN:  Soft and nontender. 

EXTREMITIES:  Without edema.



IMPRESSION:  Chronic obstructive pulmonary disease exacerbation, clinically improved.



PLAN:  Follow up with the end of next week in my office. 



He does better if I see him frequently.  I have reminded him to call me when he

starts having symptoms.  Apparently, he was having symptoms all week but was trying

to hold out to his office appointment on Tuesday. 







Job ID:  [id number]

## 2019-07-05 NOTE — HP
REASON FOR ADMISSION:  COPD exacerbation; acute on chronic respiratory failure, 
on

home oxygen; AFib with RVR. 



HISTORY OF PRESENTING ILLNESS:  The patient gives history of having shortness of

breath from last 2 days.  This finally got worse and he could not breathe and 
EMS

was summoned.  He has severe coughing spells this morning with no expectoration 
of

sputum as such.  No fever at home.  He is normally on 2 L nasal cannula oxygen 
and

he cranked it up to 4 L and tried taking some extra nebulizations, none of which

helped him.  He states he normally walks very little with a walker inside the 
house.

 He has had 2 back surgery and has had a recent fall with worsening of his back

pain.  They also tried to call Dr. Cevallos's office, but the next appointment was

fairly long ways and they decided to come to the ER.  No complaints of chest 
pain.

On arrival, the patient had AFib with RVR with rates going up to 120s.  He was 
given

Cardizem IV push 10 mg and the rate has come down to 80s. 



PAST MEDICAL AND SURGICAL HISTORY:  History of non-small-cell cancer with 
surgery

for the same; history of CHF with systolic and diastolic dysfunction, on 
Entresto;

hypertension; GERD; chronic AFib; chronic respiratory failure, on home oxygen 
at 2 L

by nasal cannula; history of coronary artery disease; benign prostatic 
hypertrophy;

macular degeneration; CKD, stage 3; gout; right inguinal hernia repair; 
umbilical

hernia repair; Achilles tendon repair; back surgery x2; hemorrhoidectomy x2; 
history

of abdominal aortic aneurysm repair; dental implants. 



CURRENT MEDICATIONS:  The patient is on:

1. Fluticasone nasal spray 2 sprays to each nostril twice daily.

2. Potassium chloride 20 mEq p.o. daily.

3. Torsemide 150 mg daily.

4. Atorvastatin 80 mg p.o. daily.

5. Allopurinol 100 mg daily.

6. Digoxin 0.125 mg on Monday, Wednesday, Friday, and Saturdays.

7. Eliquis 2.5 mg twice daily.

8. Meclizine  25 three times daily p.r.n.

9. Entresto  98/103  one tablet twice daily.

10. Aspirin 81 mg daily.

11. Singulair 10 mg daily.

12. Fosamax 70 mg once weekly.

13. Ultram p.r.n.

14. Budesonide inhaler twice daily.

15. DuoNeb q.6 hourly.

16. Mucinex 1200 mg twice daily.

17. Carvedilol 6.25 mg twice daily.

18. Flomax 0.4 mg twice daily.

19. __________ p.r.n.

20. Dulcolax suppository p.r.n.



ALLERGIES:  NO KNOWN DRUG ALLERGIES.



PERSONAL HISTORY:  Quit smoking in 1998, prior to which has smoked heavy.  Does 
not

abuse alcohol or drugs.  Lives with his wife. 



FAMILY HISTORY:  Significant for heart failure in mother.



CODE STATUS:  DNAR.  This was discussed with the patient and his wife, who is 
also

power of  at bedside. 



REVIEW OF SYSTEMS:  CONSTITUTIONAL:  Negative for weight loss or gain, ability 
to

conduct usual activities. 

SKIN:  Negative for rash, itching. 

EYES:  Negative for double vision, pain. 

ENT/MOUTH:  Negative for nose bleeding, neck stiffness, pain, tenderness. 

CARDIOVASCULAR:  Negative for palpitations, dyspnea on exertion, orthopnea. 

RESPIRATORY:  Negative for shortness of breath, wheezing, cough, hemoptysis, 
fever

or night sweats. 

GASTROINTESTINAL:  Negative for poor appetite, abdominal pain, heartburn, nausea
,

vomiting, constipation, or diarrhea. 

GENITOURINARY:  Negative for urgency, frequency, dysuria, nocturia. 

MUSCULOSKELETAL:  Negative for pain, swelling. 

NEUROLOGIC/PSYCHIATRIC:  Negative for anxiety, depression. 

ALLERGY/IMMUNOLOGIC:  Negative for skin rash, bleeding tendency.



PHYSICAL EXAMINATION:

GENERAL:  The patient is an 84-year-old male, who is currently breathing better

after receiving multiple nebulizers in the ER. 

VITAL SIGNS:  Blood pressure 138/76; pulse on arrival was 123 per minute and has

come down to 90 per minute; respiratory rate 30 per minute on arrival, 
currently 20

per minute; temperature 98.4 degrees Fahrenheit; saturating 99% on 4 L nasal 
cannula

and was 87% on 4 L on arrival. 

NECK:  Supple.  No elevated JVD. 

HEENT:  Eyes; extraocular muscles intact.  Pupils are reacting to light.  Oral

cavity, mucous membranes are dry.  No exudates or congestion. 

CARDIOVASCULAR SYSTEM: S1 and S2 heard.  Irregular rhythm. 

RESPIRATORY SYSTEM: Air entry 1+ bilateral. Wheezes plus bilateral. Rhonchi plus

bilateral. 

ABDOMEN:  Soft. Bowel sounds heard.  No tenderness, rigidity, or guarding. 

EXTREMITIES:  He has peripheral edema, especially pedal edema in both lower

extremities.  No calf tenderness. 

VASCULAR SYSTEM: Peripheral pulses 1+ bilateral.  No ischemic ulcerations or

gangrene. 

CENTRAL NERVOUS SYSTEM: No gross focal deficits noted. The patient is alert, 
awake,

oriented, well. 

PSYCHIATRIC SYSTEM: The patient's mood is euthymic.  No hallucinations or 
delusions.



LABORATORY DATA:  White count of 17, H and H of 15 and 49, platelet count 152, 
with

82% neutrophils.  Electrolytes stable.  Serum bicarb is 33; BUN 29; creatinine 
1.3;

serum glucose 142; total bilirubin 1.6; AST, ALT, alkaline phosphatase within 
normal

limits.  .  Digoxin level is less than 0.15.  Albumin is 4.1.  Chest x-
ray

done shows chronic findings with prior history of a surgery for a non-small-cell

cancer with no acute infiltrate seen.  EKG on arrival showed AFib with RVR at 
125

beats per minute. 



CLINICAL IMPRESSION AND PLAN:  The patient will be admitted to telemetry for 
acute

on chronic obstructive pulmonary disease exacerbation with acute on chronic

respiratory failure with hypoxia, atrial fibrillation with rapid ventricular

response, likely due to underlying chronic obstructive pulmonary disease

exacerbation and nebulizations.  We will place him on DuoNeb q.6 hourly, Solu-
Medrol

40 mg IV q.6 hourly, Mucinex, and Tessalon Perles.  We will also place him

empirically on Augmentin.  It is unclear if his white count is due to any 
current

infection or likely not.  Nevertheless blood cultures have been obtained in the 
ER.

We will also obtain sputum cultures.  We will continue his home dose of Eliquis,

atorvastatin, Coreg, digoxin, Singulair, Entresto, Flomax as before.  The 
patient

will be on intense bowel regimen with senna and Dulcolax suppositories as 
needed.

The patient states if he gets too restricted on fluids, he gets very 
constipated and

has to do digital evacuation with prior scarring from hemorrhoidectomy 
surgeries.

We will obtain a current echo for left ventricular function.  Per prior 
Cardiology

consultation notes, the patient has had low ejection fraction in the 25% to 30%

range in the past.  We will consult Dr. Cevallos, his pulmonologist during his stay

here.  We will also obtain PT/OT evaluations.  Continue to closely monitor him 
on

telemetry.  Currently, his atrial fibrillation is rate controlled around 90 per

minute.  If needed, he will be placed on Cardizem drip. 







Job ID:  [id number]



MTDD

## 2019-07-05 NOTE — PDOC.PN
- Subjective


Encounter Start Date: 07/05/19


Encounter Start Time: 08:40


Subjective: breathing better, no chest pain or palp





- Objective


Resuscitation Status - Order Detail:





07/03/19 19:25


Resuscitation Status Routine 


   Resuscitation Status: DNAR: NO Resuscitation


   Discussed with: d/w pt in ER #[id number], wife and POA present at bedside








MAR Reviewed: Yes


Vital Signs & Weight: 


 Vital Signs (12 hours)











  Temp Pulse Pulse Pulse Resp BP BP


 


 07/05/19 11:05   76    16  


 


 07/05/19 08:53   76     


 


 07/05/19 08:30    76  97   132/62  145/64 H


 


 07/05/19 08:00  97.8 F  76    18  


 


 07/05/19 04:00  97.5 F L  94    18  














  BP Pulse Ox Pulse Ox Pulse Ox


 


 07/05/19 11:05    


 


 07/05/19 08:53    


 


 07/05/19 08:30    95  92 L


 


 07/05/19 08:00  132/62  93 L  


 


 07/05/19 04:00  140/68  93 L  








 Weight











Weight                         225 lb 8 oz














I&O: 


 











 07/04/19 07/05/19 07/06/19





 06:59 06:59 06:59


 


Intake Total 150 252 


 


Output Total 200 400 


 


Balance -50 -148 











Result Diagrams: 


 07/04/19 05:37





 07/04/19 05:37





Phys Exam





- Physical Examination


HEENT: PERRLA, moist MMs


Neck: no JVD, supple


Respiratory: no wheezing, no rales


Cardiovascular: no significant murmur, irregular


Gastrointestinal: soft, non-tender, positive bowel sounds


Musculoskeletal: no edema, pulses present


Neurological: non-focal, moves all 4 limbs


Psychiatric: normal affect, A&O x 3





Dx/Plan


(1) Chronic respiratory failure with hypoxia


Code(s): J96.11 - CHRONIC RESPIRATORY FAILURE WITH HYPOXIA   Status: Chronic   

Comment: initial ac resp failure is resolved and is on chronic home oxygen dose

   





(2) COPD exacerbation


Code(s): J44.1 - CHRONIC OBSTRUCTIVE PULMONARY DISEASE W (ACUTE) EXACERBATION   

Status: Acute   Comment:     





(3) Chronic atrial fibrillation


Code(s): I48.2 - CHRONIC ATRIAL FIBRILLATION   Status: Chronic   Comment: 

initial afib with rvr is rate controlled now   





(4) Chronic systolic heart failure, ACC/AHA stage C


Code(s): I50.22 - CHRONIC SYSTOLIC (CONGESTIVE) HEART FAILURE   Status: Chronic

   





(5) Dyslipidemia


Code(s): E78.5 - HYPERLIPIDEMIA, UNSPECIFIED   Status: Chronic   





(6) HTN (hypertension)


Code(s): I10 - ESSENTIAL (PRIMARY) HYPERTENSION   Status: Chronic   


Qualifiers: 


   Hypertension type: essential hypertension   Qualified Code(s): I10 - 

Essential (primary) hypertension   


Comment: 


   





(7) BPH (benign prostatic hyperplasia)


Code(s): N40.0 - BENIGN PROSTATIC HYPERPLASIA WITHOUT LOWER URINRY TRACT SYMP   

Status: Chronic   


Qualifiers: 


   Lower urinary tract symptom presence: symptoms absent   Qualified Code(s): 

N40.0 - Benign prostatic hyperplasia without lower urinary tract symptoms   





- Plan


hemostable, to amb in hallway as tolerated, amb 180ft yesterday


-: change steroids to oral


-: augmentin, nebs, singulair


-: continue eliquis, lipitor, entresto, digoxin, coreg, flomax


-: dc plan per 's adv





* .








Review of Systems





- Medications/Allergies


Allergies/Adverse Reactions: 


 Allergies











Allergy/AdvReac Type Severity Reaction Status Date / Time


 


No Known Drug Allergies Allergy Unknown  Verified 01/23/18 22:50











Medications: 


 Current Medications





Acetaminophen (Tylenol)  650 mg PO Q4H PRN


   PRN Reason: Headache/Fever/Mild Pain (1-3)


Albuterol/Ipratropium (Duoneb)  3 ml NEB [id number] [facility]


   Last Admin: 07/05/19 11:05 Dose:  3 ml


Allopurinol (Zyloprim)  100 mg PO DAILY [facility]


   Last Admin: 07/05/19 08:53 Dose:  100 mg


Amoxicillin/Clavulanate Potassium (Augmentin)  875 mg PO BID [facility]


   Last Admin: 07/05/19 08:53 Dose:  875 mg


Apixaban (Eliquis)  2.5 mg PO BID [facility]


   Last Admin: 07/05/19 08:53 Dose:  2.5 mg


Atorvastatin Calcium (Lipitor)  80 mg PO DAILY [facility]


   Last Admin: 07/05/19 08:54 Dose:  80 mg


Benzonatate (Tessalon)  100 mg PO TID [facility]


   Last Admin: 07/05/19 08:53 Dose:  100 mg


Bisacodyl (Dulcolax)  10 mg NM DAILYPRN PRN


   PRN Reason: Constipation


Carvedilol (Coreg)  6.25 mg PO BID-[facility]


   Last Admin: 07/05/19 08:54 Dose:  6.25 mg


Digoxin (Lanoxin)  0.125 mg PO MoWeFrSa@0900 [facility]


   Last Admin: 07/05/19 08:53 Dose:  0.125 mg


Famotidine (Pepcid)  20 mg PO BID [facility]


   Last Admin: 07/05/19 08:54 Dose:  20 mg


Furosemide (Lasix)  40 mg PO DAILY [facility]


   Last Admin: 07/05/19 08:54 Dose:  40 mg


Guaifenesin (Mucinex)  600 mg PO Q12HR [facility]


   Last Admin: 07/05/19 08:54 Dose:  600 mg


Guaifenesin/Dextromethorphan (Robitussin Dm)  15 ml PO Q4H PRN


   PRN Reason: Cough


Meclizine HCl (Antivert)  25 mg PO TID PRN


   PRN Reason: Dizziness


Methylprednisolone Sodium Succinate (Solu-Medrol)  40 mg IVP Q6HR [facility]


   Last Admin: 07/05/19 05:20 Dose:  40 mg


Montelukast Sodium (Singulair)  10 mg PO HS [facility]


   Last Admin: 07/04/19 22:43 Dose:  10 mg


Saccharomyces Boulardii (Florastor)  250 mg PO DAILY [facility]


   Last Admin: 07/05/19 08:53 Dose:  250 mg


Sacubitril/Valsartan (Entresto 49 Mg-51 Mg Tablet)  2 tab PO BID [facility]


   Last Admin: 07/05/19 08:54 Dose:  2 tab


Senna/Docusate Sodium (Senokot S)  2 tab PO BID [facility]


   Last Admin: 07/05/19 08:53 Dose:  2 tab


Sodium Chloride (Flush - Normal Saline)  10 ml IVF Q12HR [facility]


   Last Admin: 07/05/19 08:54 Dose:  10 ml


Sodium Chloride (Flush - Normal Saline)  10 ml IVF PRN PRN


   PRN Reason: Saline Flush


Tamsulosin HCl (Flomax)  0.4 mg PO BID [facility]


   Last Admin: 07/05/19 08:54 Dose:  0.4 mg

## 2019-07-06 NOTE — EKG
Test Reason : 

Blood Pressure : ***/*** mmHG

Vent. Rate : 125 BPM     Atrial Rate : 119 BPM

   P-R Int : 000 ms          QRS Dur : 102 ms

    QT Int : 350 ms       P-R-T Axes : 000 043 257 degrees

   QTc Int : 505 ms

 

Undetermined rhythm

Incomplete right bundle branch block

Septal infarct , age undetermined

Abnormal ECG

 

Confirmed by BRYSON GREENE ([id number]),  SHERIDAN MARQUEZ ([id number]) on 7/6/2019 11:42:47 PM

 

Referred By:             Confirmed By:BRYSON GREENE

## 2019-08-05 ENCOUNTER — HOSPITAL ENCOUNTER (OUTPATIENT)
Dept: HOSPITAL 92 - MRI | Age: 84
Discharge: HOME | End: 2019-08-05
Attending: NEUROLOGICAL SURGERY
Payer: MEDICARE

## 2019-08-05 DIAGNOSIS — I71.4: ICD-10-CM

## 2019-08-05 DIAGNOSIS — M51.36: Primary | ICD-10-CM

## 2019-08-05 DIAGNOSIS — M48.061: ICD-10-CM

## 2019-08-05 DIAGNOSIS — M51.26: ICD-10-CM

## 2019-08-05 DIAGNOSIS — M47.816: ICD-10-CM

## 2019-08-05 PROCEDURE — 72158 MRI LUMBAR SPINE W/O & W/DYE: CPT

## 2019-08-05 NOTE — MRI
MRI LUMBAR SPINE WITH AND WITHOUT CONTRAST:

 

Date: 8-5-19 

 

Comparison: None. 

 

History: Low back pain with tingling down the leg. 

 

Technique: Multiplanar, multisequence MR imaging of the lumbar spine obtained with and without contra
st. 

 

FINDINGS: 

The sagittal STIR imaging demonstrates no focal area of osseous marrow edema. There is mild increased
 STIR signal suggesting edema posterior to the facet joints at L4-5 bilaterally. There is partial fus
ion at the L4-5 disc interspace. There appears to be a intervertebral disc devise at L4-5 which is sl
ightly inferior to the axial level of the intervertebral disc which may signify mild intraosseous inf
erior migration. 

 

On the basis of five lumbar type vertebral bodies, the conus medullaris terminates at the T12-L1 leve
l. 

 

T12-L1: Mild disc space narrowing, disc desiccation and minimal disc bulge. Mild bilateral facet hype
rtrophy. No significant central canal or neural foraminal stenosis. 

 

L2-3: Mild bilateral facet hypertrophy and hypertrophy of the ligamentum flavum, left greater than ri
ght. There is disc desiccation and mild disc bulge with no significant central canal or neural forami
nal stenosis. 

 

L3-4: There is prominent bilateral facet hypertrophy and hypertrophy of the ligamentum flavum with di
sc bulge causing severe central canal stenosis. No significant neural foraminal stenosis.

 

There is a disc extrusion with inferior migration in the central/right paracentral region. This extru
ded disc extends into the right lateral recess and measures 2 cm in craniocaudal dimension. There is 
severe right lateral recess stenosis at the L4 level. 

 

L4-5: There is fusion of the facet joints, especially on the right. There is severe right neural fora
mendy stenosis on the basis of osteophyte formation. There is mild left neural foraminal stenosis. Th
ere is mild central canal stenosis. 

 

L5-S1: Disc space narrowing and disc bulge with mild central canal stenosis. Mild bilateral facet hyp
ertrophy. Mild bilateral neural foraminal stenosis. 

 

There is aneurysmal dilatation of the abdominal aorta measuring up to 4.5 cm in transverse dimension.
 

 

There is an area of heterogeneity involving the renal hilum on the left which may represent an extrar
enal pelvis or parapelvic cyst, not optimally assessed on the basis of motion artifact. 

 

The post contrast imaging demonstrates no abnormal enhancement involving the contents of the thecal s
ac or nerve roots of the cauda equina. 

 

IMPRESSION: 

1. 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 There is severe central canal stenos
is at L3-4. A large right paracentral disc extrusion with inferior migration posterior to the L4 vert
ebral body causes severe right lateral recess stenosis in this region. 

2. Abdominal aortic aneurysm. 

3. Recommend CT examination of the abdomen/pelvis for full characterization. 

 

Code T 

 

POS: MARY

## 2023-06-14 NOTE — PRG
DATE OF SERVICE:  09/02/2018

 

SUBJECTIVE:  He says he is feeling a little bit better, but he is still short of breath when he takes
 BiPAP off.

 

OBJECTIVE:

VITAL SIGNS:  He is afebrile, heart rate is 82, respiratory rate is 20 on BiPAP, oximetry is 99, bloo
d pressure 95/51.

LUNGS:  Remarkable for rhonchi on the right.

CARDIOVASCULAR:  Regular rhythm.

ABDOMEN:  Soft.

EXTREMITIES:  Without asymmetry.

NEUROLOGIC:  Nonfocal.

 

LABORATORY DATA:  White count is 13.3, hemoglobin 14.3, platelets 119.  Sodium 135, potassium 4, chlo
ride 90, bicarbonate 31, BUN 31, creatinine 1.49.

 

Intake and output is positive 825.

 

IMPRESSION:

1.  Pneumonia with a lung abscess.

2.  Status post ? bilobectomy on the right for nonsmall cell lung cancer.

3.  Underlying chronic obstructive pulmonary disease.

4.  Cardiomyopathy.

5.  Deconditioning.

 

PLAN:  Continue current supportive care measures.  I will check a chest x-ray in the morning.  Contin
ue his antimicrobial therapy and BiPAP p.r.n.
DATE OF SERVICE:  09/03/2018

 

SUBJECTIVE:  The patient is currently on BiPAP, seems to be resting comfortably.

 

OBJECTIVE:

VITAL SIGNS:  On exam, temperature is 96.4, pulse 82, respirations 24, O2 sat 94% on the BiPAP.

HEENT:  Unremarkable.

NECK:  No adenopathy, JVD, or bruits.

CARDIAC:  S1 and S2 is irregular.

ABDOMEN:  Soft.

EXTREMITIES:  No edema.

 

IMAGING:  Chest x-ray demonstrates complex density on the right side, very hard to describe.  I revie
wed chest CT from 2 days ago.  The patient has a huge right upper lobe bullous-type lesion with air f
luid level, which I think is probably exactly the same thing that we are seeing on the chest x-ray.  
Based on comparison I think it is all unchanged.

 

LABORATORY DATA:  White blood cell count 18.5, hematocrit 26.5, platelet count 109.  Sodium 137, pota
ssium 4.1, chloride 100, CO2 of 30, BUN 44, creatinine 1.5, glucose 163.

 

ASSESSMENT:

1.  Right upper lobe bullitis, which I believe he has bled into.

2.  History of right upper lobectomy for non-small cell lung cancer.

3.  Chronic obstructive pulmonary disease.

4.  Cardiomyopathy.

 

PLAN:

1.  Continue supportive measures with BiPAP.

2.  Continuing antibiotics.

3.  Patient's prognosis is extremely poor.
DATE OF SERVICE:  09/04/2018

 

Mr. Tomas feels somewhat better today, but he is still having trouble breathing.  

 

PHYSICAL EXAMINATION: 

VITAL SIGNS:  His blood pressure 120/54, pulse 80-90, it is irregularly irregular.

LUNGS:  Rhonchi.

CARDIAC:  Irregular. .

ABDOMEN:  Soft, nontender.

EXTREMITIES:  Moderate edema.

 

ASSESSMENT:

1.  Congestive heart failure, systolic, chronic volume overloaded.

2.  Atrial fibrillation, chronic, rate controlled.

3.  Bleeding into his lung.

 

PLAN:

1.  Anticoagulation had to be stopped.

2.  Resume diuretics.

3.  Continue Entresto. 

 

Poor prognosis.  We will continue to follow with you.
DATE OF SERVICE:  09/04/2018

 

SUBJECTIVE:  Mr. Tomas is doing well.  He has no complaints.  He is off BiPAP, but he cannot move w
ithout getting extremely short of breath.  He still has a little bit of hemoptysis, but this is impro
ving.

 

OBJECTIVE:

VITAL SIGNS:  He is afebrile, heart rates in the 90s, respiratory rate is 21, oximetry is 99 on 3.5 l
iters, blood pressure 120/54.

LUNGS:  Clear.

HEART:  Regular rhythm.

ABDOMEN:  Soft and nontender.

EXTREMITIES:  Without asymmetry.

 

He received Lasix this morning.

 

IMPRESSION:

1.  Systolic heart failure.

2.  Lung abscess versus hemorrhage into the cavity.

3.  Chronic obstructive pulmonary disease.

 

PLAN:  Antibiotics, nebulizer treatments, steroids, BiPAP with sleep as needed.  I appreciate Cardiol
ogy's assistance.  He will remain in the Intermediate Care Unit for now.
DATE OF SERVICE:  09/05/2018

 

SUBJECTIVE:  He is feeling much better.  He is in no distress.  He is wearing BiPAP much less.

 

OBJECTIVE:

VITAL SIGNS:  He is afebrile, heart rate is 77, respiratory rate is 24, oximetry is _____, blood pres
sure _____.  Intake and outputs, positive 946.

LUNGS:  Remarkable for improved rhonchi.

HEART:  Regular rhythm.

ABDOMEN:  Soft.

EXTREMITIES:  Without asymmetry.

 

LABORATORY DATA:  White count 18.5, hemoglobin 12.2, platelets 109.  Sodium 140, potassium 4.5, chlor
david 99, bicarbonate 36, BUN 40, creatinine 1.33.

 

IMPRESSION:

1.  Respiratory failure requiring noninvasive ventilation, much better.

2.  Sleep apnea.

3.  Chronic obstructive pulmonary disease.

4.  Lung abscess versus a bolus airspace with hemorrhage into it.  I suspect it is a combination of b
oth.

5.  Systolic and diastolic heart failure that is stable.

6.  Chronic atrial fibrillation.

 

PLAN:  Continue supportive care.  Probably hopefully move him out of the intermediate care unit tomor
row to regular bed.
DATE OF SERVICE:  09/05/2018

 

SUBJECTIVE:  Mr. Tomas looks and feels better today.  He says his breathing has improved.  He is on
 nasal cannula now.

 

No chest pain.

 

OBJECTIVE:

VITAL SIGNS:  Blood pressure 114/50, pulse 90, it is irregular.

LUNGS:  Clear.

CARDIAC:  Irregularly irregular.

ABDOMEN:  Soft, nontender.

EXTREMITIES:  Mild to moderate edema.

 

ASSESSMENT:

1.  Congestive heart failure systolic, diastolic combined, improved.

2.  Chronic atrial fibrillation.

3.  Bleeding into his lung.

 

PLAN:

1.  Continue intravenous diuretics one more day, consider stopping tomorrow.

2.  Do a chest x-ray tomorrow.

3.  Check base met tomorrow, may need to change to oral diuretics tomorrow.
DATE OF SERVICE:  09/06/2018

 

SUBJECTIVE:  Mr. Tomas says he is breathing somewhat better, still coughing up some blood.

 

No chest pain.

 

PHYSICAL EXAMINATION:

VITAL SIGNS:  Blood pressure 130/65, pulse is 100 and irregular.

LUNGS:  Diffuse rhonchi.

CARDIAC:  Irregular.

ABDOMEN:  Soft, nontender.

EXTREMITIES:  Only mild edema.

 

PERTINENT LABORATORY DATA:  Creatinine is better at 1.24.  His hemoglobin is stable at 12.2 on the 3r
d.

 

ASSESSMENT:

1.  Congestive heart failure, systolic, diastolic mixed.

2.  Atrial fibrillation, chronic.

3.  _____ pulmonary bleeding.

4.  Low platelet count.

 

PLAN:

1.  Continue intravenous diuretics today.  Consider changing to oral tomorrow.

2.  Continue _____ (Entresto), patient slowly improving.
DATE OF SERVICE:  09/07/2018

 

SUBJECTIVE:  Mr. Tomas is sitting up in the chair, doing somewhat better.

 

OBJECTIVE:

VITAL SIGNS:  Blood pressure 114/50, pulse in the 70-80, irregularly irregular.

LUNGS:  He has some diffuse rhonchi.

CARDIAC:  Irregular.

ABDOMEN:  Soft, nontender.

EXTREMITIES:  Only mild edema.

 

LABORATORY DATA:  Sodium 137, potassium 4.0, chloride 97, CO2 of 33, BUN 30, creatinine 1.06.

 

ASSESSMENT:

1.  Congestive heart failure seems to have improved.  Renal function actually has improved on Entrest
o.

2.  Still on intravenous diuretics.

3.  Chronic atrial fibrillation.

4.  Possible bleeding into his lung on anticoagulation, which he was _____.

 

PLAN:  Continue current medical regimen.  If the creatinine increases, may need to discontinue intrav
enous diuretics.  Dr. Lima will to see the patient this weekend.
DATE OF SERVICE:  09/08/2018

 

SUBJECTIVE:  The patient feels better.  He is sitting up in a chair.

 

PHYSICAL EXAMINATION:

VITAL SIGNS:  Temperature 97.1, pulse 76, blood pressure 107/52, O2 sat 95% on 4 liters.  24 hour int
oswaldo 1180, output 2925.

HEENT:  Unremarkable.

NECK:  No JVD.

LUNGS:  Clear.

ABDOMEN:  Soft.

CARDIAC:  S1 and S2 regular.

EXTREMITIES:  No edema.

 

LABORATORY DATA:  White blood cell count 16.2, hematocrit 38.2, platelet count 131.  Sodium 137, pota
ssium 3.9, chloride 96, CO2 of 37, BUN 37, creatinine 1.2, glucose 175.

 

ASSESSMENT:

1.  Lung mass/abscess with hemorrhage.

2.  Congestive heart failure.

3.  Obstructive lung disease.

4.  History of previous lung cancer resection.

5.  Chronic obstructive pulmonary disease.

 

PLAN:  The patient will be moved to the medical floor.  His steroids will be switched to oral medicat
ion.  He is likely going to the nursing home next week.
DATE OF SERVICE:  09/09/2018

 

SUBJECTIVE:  His hemoptysis has slowed down somewhat.  He is in good spirits.  His family is in the r
oom.

 

PHYSICAL EXAMINATION:

VITAL SIGNS:  His temperature is 96.8, pulse 80, blood pressure 108/48, respiratory rate 16, O2 sat 9
5% on 4 liters.

HEENT:  Unremarkable.

NECK:  No JVD.

LUNGS:  Fairly clear.

CARDIAC:  S1 and S2 regular.

ABDOMEN:  Soft.

EXTREMITIES:  No edema.

 

LABORATORY DATA:  Sodium 137, potassium 3.5, BUN 45, creatinine 1.2, glucose 154.

 

ASSESSMENT:

1.  Extensive right lung abscess cavity with hemorrhage.

2.  Congestive heart failure.

3.  Obstructive lung disease.

4.  History of previous lung cancer.

 

PLAN:  Continuing oral antibiotics and oral steroids.  He will need antibiotics for a significant haylie
unt of time.
DATE OF SERVICE:  09/10/2018

 

HISTORY:  Mr. Tomas is continuing to improve, is feeling better.  No chest pain or pressure.

 

PHYSICAL EXAMINATION:

VITAL SIGNS:  His blood pressure, however, was low at _____, but previously 116/78, pulse in the 100 
range.

LUNGS:  Some rhonchi and rales at the right side.

CARDIAC:  Irregularly irregular.

ABDOMEN:  Soft, nontender.

EXTREMITIES:  No edema.

 

ASSESSMENT:

1.  Congestive heart failure, systolic, chronic.

2.  There is probably bleeding into the right lung.

2.  Atrial fibrillation, chronic.

 

PLAN:

1.  He is on furosemide.

2.  Entresto.

3.  Low-dose digoxin.

4.  Low-dose carvedilol.
DATE OF SERVICE:  09/10/2018

 

SUBJECTIVE:  Obie Tomas is doing well.  He has not worn BiPAP at night anymore.  He says he feels g
ood.  He actually was not allowed to ambulate for some reason today with physical therapy.  He tells 
me his blood pressure dropped when he sat up.  I am not sure how long the therapist awaited.

 

OBJECTIVE:

VITAL SIGNS:  His blood pressure this afternoon is 148/58, temperature is 97.8, heart rate 58, respir
atory rate 16, oximetry is 93-96 on 4 liters.

LUNGS:  Clear.

HEART:  Regular rhythm.

ABDOMEN:  Soft.

 

LABORATORY DATA:  White count is 17.6, hemoglobin 11.8, platelets 108,000.  Electrolytes were unremar
kable.  BUN is 42, creatinine 0.87.

 

IMPRESSION:

1.  Pneumonia with lung abscess and probably a hemorrhage into an abscess because of anticoagulation.


2.  Chronic atrial fibrillation.

3.  Chronic obstructive pulmonary disease.

4.  Status post resection of a nonsmall cell lung cancer, surgical cure.

5.  Systolic heart failure.

6.  Chronic kidney disease, slightly prerenal.

 

PLAN:  Continue current antibiotics.  He needs antibiotics for 2 weeks.  He needs slow prednisone tap
er.  Should not be taken off of his prednisone until he is fully recovered.  He needs it between 10 a
nd 20 mg a day.  I will be happy to talk to _____.  We will care for him at the nursing home during s
killed care.
DATE OF SERVICE: 09/07/2018

 

Mr. Tomas is feeling better.  He still cannot walk very far without getting short of breath.  Going
 to the bedside chair gets him out of breath.  

 

PHYSICAL EXAMINATION: 

VITAL SIGNS:  He is afebrile, heart rate 92, respiratory rate 20, oximetry is 98,  blood pressure 114
/50.

LUNGS:  Remarkable for rhonchi on the right.

HEART:  Regular rhythm.

ABDOMEN:  Soft.

 

Each day he looks a little better.  

 

LABORATORY:  White count 16.7, hemoglobin 12.6, platelets 131.

 

Sodium 137, potassium 4, chloride 97, bicarbonate 33, BUN 30, creatinine 1.06.

 

We will switch him to p.o. antimicrobial therapy and switch him to p.o. steroids as well probably anil
orrow after we cut back his dose today.
DATE OR SERVICE:  09/06/2018

 

Mr. Tomas continues to improve.  He is still coughing up blood, but nowhere near as much blood.

 

PHYSICAL EXAMINATION:

VITAL SIGNS:  Blood pressure 146/82, heart rate is 85.  He is afebrile.  Respiratory rates in the 20s
, oximetry is 97 on 4 liter cannula.

LUNGS:  Remarkable for mild rhonchi on the right.

CARDIOVASCULAR:  Regular rhythm.

ABDOMEN:  Soft.

 

IMPRESSION:

1.? Lung mass, abscess versus hemorrhage into an abscess versus hemorrhage into a bolus air space.

2.  Anticoagulation prior to admission.

3.  Congestive heart failure had been relatively stable prior to admission.

4.  Underlying obstructive lung disease.

5.  Status post resection of lung cancer.

6.  Chronic obstructive pulmonary disease, clinically improved with noninvasive ventilation, steroids
 and nebulized treatments.

 

LABORATORY:  Reviewed.  His white count is 18.5, hemoglobin is 12, platelets 109,000.  Electrolytes w
ere unremarkable with the exception of bicarbonate of 35, which is expected, BUN 35, creatinine is 1.
24 down from 1.55.  Overall, he appears to be clinically improved.

 

PLAN:  We will continue current care.
show